# Patient Record
Sex: MALE | Race: WHITE | NOT HISPANIC OR LATINO | Employment: FULL TIME | URBAN - METROPOLITAN AREA
[De-identification: names, ages, dates, MRNs, and addresses within clinical notes are randomized per-mention and may not be internally consistent; named-entity substitution may affect disease eponyms.]

---

## 2020-06-08 ENCOUNTER — TRANSCRIBE ORDERS (OUTPATIENT)
Dept: ADMINISTRATIVE | Facility: HOSPITAL | Age: 55
End: 2020-06-08

## 2020-06-08 ENCOUNTER — HOSPITAL ENCOUNTER (OUTPATIENT)
Dept: RADIOLOGY | Facility: HOSPITAL | Age: 55
Discharge: HOME | End: 2020-06-08
Attending: PODIATRIST
Payer: COMMERCIAL

## 2020-06-08 DIAGNOSIS — S90.31XA CONTUSION OF RIGHT FOOT, INITIAL ENCOUNTER: Primary | ICD-10-CM

## 2020-06-08 DIAGNOSIS — S90.31XA CONTUSION OF RIGHT FOOT, INITIAL ENCOUNTER: ICD-10-CM

## 2020-06-08 PROCEDURE — 73630 X-RAY EXAM OF FOOT: CPT

## 2020-06-09 ENCOUNTER — APPOINTMENT (OUTPATIENT)
Dept: URGENT CARE | Age: 55
End: 2020-06-09
Payer: OTHER MISCELLANEOUS

## 2020-06-09 PROCEDURE — 90471 IMMUNIZATION ADMIN: CPT | Performed by: NURSE PRACTITIONER

## 2020-06-09 PROCEDURE — 99283 EMERGENCY DEPT VISIT LOW MDM: CPT | Performed by: NURSE PRACTITIONER

## 2020-06-09 PROCEDURE — 90715 TDAP VACCINE 7 YRS/> IM: CPT

## 2020-06-09 PROCEDURE — G0382 LEV 3 HOSP TYPE B ED VISIT: HCPCS | Performed by: NURSE PRACTITIONER

## 2024-06-21 ENCOUNTER — NURSING HOME VISIT (OUTPATIENT)
Dept: GERIATRICS | Facility: OTHER | Age: 59
End: 2024-06-21
Payer: COMMERCIAL

## 2024-06-21 DIAGNOSIS — I15.0 RENOVASCULAR HYPERTENSION: ICD-10-CM

## 2024-06-21 DIAGNOSIS — I73.9 PERIPHERAL ARTERIAL DISEASE (HCC): Primary | ICD-10-CM

## 2024-06-21 DIAGNOSIS — Z87.891 HISTORY OF TOBACCO USE DISORDER: ICD-10-CM

## 2024-06-21 DIAGNOSIS — I69.30 HISTORY OF CVA WITH RESIDUAL DEFICIT: ICD-10-CM

## 2024-06-21 DIAGNOSIS — E78.5 HYPERLIPIDEMIA, UNSPECIFIED HYPERLIPIDEMIA TYPE: ICD-10-CM

## 2024-06-21 DIAGNOSIS — Z66 DNR (DO NOT RESUSCITATE): ICD-10-CM

## 2024-06-21 DIAGNOSIS — Z89.512 HISTORY OF LEFT BELOW KNEE AMPUTATION (HCC): ICD-10-CM

## 2024-06-21 DIAGNOSIS — I70.1 RENAL ARTERY STENOSIS, NATIVE, BILATERAL (HCC): ICD-10-CM

## 2024-06-21 DIAGNOSIS — G54.6 PHANTOM PAIN FOLLOWING AMPUTATION OF LOWER LIMB (HCC): ICD-10-CM

## 2024-06-21 PROCEDURE — 99306 1ST NF CARE HIGH MDM 50: CPT | Performed by: INTERNAL MEDICINE

## 2024-06-24 PROBLEM — Z89.512 HISTORY OF LEFT BELOW KNEE AMPUTATION (HCC): Status: ACTIVE | Noted: 2024-06-24

## 2024-06-24 PROBLEM — I73.9 PERIPHERAL ARTERIAL DISEASE (HCC): Status: ACTIVE | Noted: 2024-06-24

## 2024-06-24 PROBLEM — Z66 DNR (DO NOT RESUSCITATE): Status: ACTIVE | Noted: 2024-06-21

## 2024-06-24 PROBLEM — G54.6 PHANTOM PAIN FOLLOWING AMPUTATION OF LOWER LIMB (HCC): Status: ACTIVE | Noted: 2024-06-24

## 2024-06-24 PROBLEM — Z86.73 HISTORY OF CVA (CEREBROVASCULAR ACCIDENT): Status: ACTIVE | Noted: 2024-06-24

## 2024-06-24 PROBLEM — I69.30 HISTORY OF CVA WITH RESIDUAL DEFICIT: Status: ACTIVE | Noted: 2024-06-24

## 2024-06-24 PROBLEM — I70.1 RENAL ARTERY STENOSIS, NATIVE, BILATERAL (HCC): Status: ACTIVE | Noted: 2024-06-24

## 2024-06-24 PROBLEM — I10 ESSENTIAL HYPERTENSION: Status: ACTIVE | Noted: 2024-06-24

## 2024-06-24 PROBLEM — E78.5 HYPERLIPIDEMIA: Status: ACTIVE | Noted: 2024-06-24

## 2024-06-24 PROBLEM — I15.0 RENOVASCULAR HYPERTENSION: Status: ACTIVE | Noted: 2024-06-24

## 2024-06-24 PROBLEM — Z87.891 HISTORY OF TOBACCO USE DISORDER: Status: ACTIVE | Noted: 2024-06-24

## 2024-06-24 RX ORDER — TAMSULOSIN HYDROCHLORIDE 0.4 MG/1
0.4 CAPSULE ORAL
COMMUNITY

## 2024-06-24 RX ORDER — SENNOSIDES A AND B 8.6 MG/1
1 TABLET, FILM COATED ORAL
COMMUNITY

## 2024-06-24 RX ORDER — OXYCODONE HYDROCHLORIDE 10 MG/1
10 TABLET ORAL EVERY 4 HOURS PRN
COMMUNITY

## 2024-06-24 RX ORDER — LISINOPRIL 10 MG/1
5 TABLET ORAL DAILY
COMMUNITY

## 2024-06-24 RX ORDER — ASPIRIN 81 MG/1
81 TABLET, CHEWABLE ORAL DAILY
COMMUNITY

## 2024-06-24 RX ORDER — ALLOPURINOL 100 MG/1
100 TABLET ORAL DAILY
COMMUNITY

## 2024-06-24 RX ORDER — CLOPIDOGREL BISULFATE 75 MG/1
75 TABLET ORAL DAILY
COMMUNITY

## 2024-06-24 RX ORDER — GABAPENTIN 300 MG/1
400 CAPSULE ORAL 3 TIMES DAILY
COMMUNITY

## 2024-06-24 RX ORDER — METHOCARBAMOL 500 MG/1
500 TABLET, FILM COATED ORAL 3 TIMES DAILY
COMMUNITY

## 2024-06-24 RX ORDER — POLYETHYLENE GLYCOL 3350 17 G/17G
17 POWDER, FOR SOLUTION ORAL DAILY PRN
COMMUNITY

## 2024-06-24 RX ORDER — ROSUVASTATIN CALCIUM 20 MG/1
20 TABLET, COATED ORAL DAILY
COMMUNITY

## 2024-06-24 NOTE — ASSESSMENT & PLAN NOTE
"As discussed with him during my visit, he wishes for his resuscitation status to be \"\"DO NOT RESUSCITATE\"  "

## 2024-06-24 NOTE — ASSESSMENT & PLAN NOTE
When asked, he reports not requiring nicotine replacement therapy  Will continue with monitoring for change in his condition

## 2024-06-24 NOTE — ASSESSMENT & PLAN NOTE
Will continue his prior to admission rosuvastatin, which he confirms that he was taking without difficulty prior to admission  He will follow-up with his PCP upon discharge

## 2024-06-24 NOTE — ASSESSMENT & PLAN NOTE
September 11, 2023: Bilateral renal artery angioplasty with stenting (Dr. Ba)  Will continue his prior to admission aspirin, clopidogrel, and rosuvastatin  Will continue with monitoring for change in his condition

## 2024-06-24 NOTE — ASSESSMENT & PLAN NOTE
Status post multiple bypass procedures  Will continue his prior to admission aspirin, clopidogrel, and rosuvastatin  Will continue his care in collaboration with his vascular surgery service  He will follow-up with his vascular surgery service upon discharge

## 2024-06-24 NOTE — PROGRESS NOTES
[facility]  [address]  [place] [address]  Facility: [facility] [place] [address]    HISTORY AND PHYSICAL    NAME: Todd Spaulding  AGE: 58 y.o. SEX: male    DATE OF ENCOUNTER: June 21, 2024.    Code status:   DNR    Assessment and Plan     1. Peripheral arterial disease (HCC)  Assessment & Plan:  Status post multiple bypass procedures  Will continue his prior to admission aspirin, clopidogrel, and rosuvastatin  Will continue his care in collaboration with his vascular surgery service  He will follow-up with his vascular surgery service upon discharge  2. History of left below knee amputation (HCC)  Assessment & Plan:  June 5, 2024: Secondary to occluded bypass  Will continue his care in collaboration with his vascular surgery service  Will continue with multimodal pain management (he reports and record review confirms that he was taking oxycodone 10 mg every 4 hours as needed prior to his procedure)  Secondary to his decreased level of functioning from baseline, he will be admitted to the subacute rehabilitation facility and seen in consultation by a multidisciplinary rehabilitation team for evaluation and treatment to assist him in returning to his prior level of functioning   Will continue with monitoring for changes condition  He will follow-up with his PCP and vascular surgery services upon discharge  3. Renovascular hypertension  Assessment & Plan:  Will continue his prior to admission lisinopril  Will continue with clinical and periodic laboratory monitoring for change in his condition  He will follow-up with his PCP upon discharge  4. Hyperlipidemia, unspecified hyperlipidemia type  Assessment & Plan:  Will continue his prior to admission rosuvastatin, which he confirms that he was taking without difficulty prior to admission  He will follow-up with his PCP upon discharge  5. Phantom pain following amputation of lower limb (HCC)  Assessment  "& Plan:  Will continue with multimodal pain management  To consider increasing dose of gabapentin going forward  Will continue with monitoring for change in his condition  6. Renal artery stenosis, native, bilateral (HCC)  Assessment & Plan:  September 11, 2023: Bilateral renal artery angioplasty with stenting (Dr. Ba)  Will continue his prior to admission aspirin, clopidogrel, and rosuvastatin  Will continue with monitoring for change in his condition  7. History of CVA with residual deficit  Assessment & Plan:  Reported residual of gait instability  Will continue prior to admission aspirin, clopidogrel, and rosuvastatin for secondary stroke prevention  Secondary to his decreased level of functioning from baseline, he will be admitted to the subacute rehabilitation facility and seen in consultation by a multidisciplinary rehabilitation team for evaluation and treatment to assist him in returning to his prior level of functioning   Will continue with monitoring for change in his condition  He will follow-up with his PCP upon discharge  8. History of tobacco use disorder  Assessment & Plan:  When asked, he reports not requiring nicotine replacement therapy  Will continue with monitoring for change in his condition  9. DNR (do not resuscitate)  Assessment & Plan:  As discussed with him during my visit, he wishes for his resuscitation status to be \"\"DO NOT RESUSCITATE\"    See recent hospital discharge summary note for further information.    All medications and routine orders were reviewed and updated as needed.    Plan discussed with: Patient and nursing staff.    I have spent a total time of 100 minutes on June 21, 2024 in caring for this patient including Diagnostic results, Instructions for management, Impressions, Counseling / Coordination of care, Documenting in the medical record, Reviewing / ordering tests, medicine, procedures  , Obtaining or reviewing history  , and Communicating with other healthcare " professionals .     Chief Complaint     He is seen for a visit to perform a history and physical exam to be admitted to the subacute rehabilitation facility.    History of Present Illness     History is obtained from the patient and review of outside electronic medical records.    He is a pleasant 58-year-old gentleman with the comorbidities of peripheral arterial disease status post multiple revascularization procedures, renovascular hypertension (bilateral renal artery stents in situ), hyperlipidemia, history of CVA, history of tobacco use disorder, and history of testicular cancer status post left orchiectomy and XRT who is seen for a visit to perform a history and physical exam to be admitted to the subacute rehabilitation facility.  At his visit on May 30, 2024 with his vascular surgery service, decision was made to perform a left below-knee amputation secondary to occlusion of prior left common femoral artery to left posterior tibial artery with in situ GSV.  He was admitted to the acute care hospital from June 5, 2024 through June 20, 2024.  He underwent a left below-knee amputation on June 5, 2024.  He has done well postoperatively and is transferred to the subacute rehabilitation facility for comprehensive rehabilitation program.    Other than some phantom limb pain and surgical site discomfort, he is without complaints.    HISTORY:  Past Medical History:   Diagnosis Date    Cancer (HCC)     past hx testicular cancer.    Testicular cancer (HCC)     Left orchiectomy    TIA (transient ischemic attack)     September     Past Surgical History:   Procedure Laterality Date    AORTA - BILATERAL FEMORAL ARTERY BYPASS GRAFT  09/15/2023    Dr. Ba    CATARACT EXTRACTION, BILATERAL      FEMORAL ARTERY STENT Right 08/07/2020    LEG AMPUTATION THROUGH KNEE Left 06/05/2024    Below-knee    ORCHIECTOMY Left 2000    Status post XRT    OTHER SURGICAL HISTORY      Surgery for cancer removal    RENAL ARTERY STENT  Bilateral 2023    Bilateral renal artery angioplasty and stenting (Dr. Ba)     Family History   Adopted: Yes     Social History     Socioeconomic History    Marital status: Single     Spouse name: None    Number of children: None    Years of education: None    Highest education level: None   Occupational History    None   Tobacco Use    Smoking status: Former     Current packs/day: 0.00     Types: Cigarettes     Quit date: 2023     Years since quittin.8    Smokeless tobacco: None   Substance and Sexual Activity    Alcohol use: Yes    Drug use: No    Sexual activity: None   Other Topics Concern    None   Social History Narrative    None     Social Determinants of Health     Financial Resource Strain: High Risk (2024)    Received from [facility]    Overall Financial Resource Strain (CARDIA)     Difficulty of Paying Living Expenses: Hard   Food Insecurity: Food Insecurity Present (2024)    Received from [facility]    Hunger Vital Sign     Worried About Running Out of Food in the Last Year: Sometimes true     Ran Out of Food in the Last Year: Never true   Transportation Needs: No Transportation Needs (2024)    Received from [facility]    PRAPARE - Transportation     Lack of Transportation (Medical): No     Lack of Transportation (Non-Medical): No   Physical Activity: Not on file   Stress: No Stress Concern Present (2023)    Received from [facility]    [place] [employer] - Occupational Stress Questionnaire     Feeling of Stress : Only a little   Social Connections: Feeling Socially Integrated (4/15/2024)    Received from [facility]    OASIS : Social Isolation     How often do you feel lonely or isolated from those around you?: Never   Intimate Partner Violence: Not At Risk (2024)    Received from [facility]    Humiliation, Afraid, Rape, and  Kick questionnaire     Fear of Current or Ex-Partner: No     Emotionally Abused: No     Physically Abused: No     Sexually Abused: No   Housing Stability: High Risk (6/20/2024)    Received from [facility]    Housing Stability Vital Sign     Unable to Pay for Housing in the Last Year: Yes     Number of Times Moved in the Last Year: 1     Homeless in the Last Year: Yes       Allergies:  Allergies   Allergen Reactions    Lipitor [Atorvastatin] Rash       Review of Systems     Review of Systems   Respiratory:  Negative for shortness of breath.    Cardiovascular:  Negative for chest pain.   Gastrointestinal:  Negative for constipation.   Genitourinary:  Negative for difficulty urinating.   Neurological:  Negative for headaches.        He notes discomfort at his surgical site and phantom limb discomfort.   Psychiatric/Behavioral:  Negative for suicidal ideas.        Medications and orders     All medications reviewed and updated in [facility] EMR.      Objective     Vitals: Weight 179 pounds, temperature 98 °F, pulse 68, blood pressure 144/70.    Physical Exam  Vitals reviewed.   Constitutional:       General: He is awake. He is not in acute distress.     Appearance: He is well-developed. He is not toxic-appearing or diaphoretic.      Comments: He appears comfortable lying in bed, stated age, and healthy.   Eyes:      General: No scleral icterus.  Cardiovascular:      Rate and Rhythm: Normal rate and regular rhythm.      Heart sounds: Normal heart sounds. No murmur heard.     No friction rub. No gallop.   Pulmonary:      Effort: No respiratory distress.      Breath sounds: Normal breath sounds. No stridor. No wheezing, rhonchi or rales.   Musculoskeletal:      Cervical back: Neck supple.      Right lower leg: No edema.      Left lower leg: No edema.      Comments: Left BKA dressing in place.   Neurological:      Mental Status: He is alert.   Psychiatric:         Mood and Affect: Mood normal.          "Behavior: Behavior normal. Behavior is cooperative.       Pertinent Laboratory/Diagnostic Studies:   The following labs/studies were reviewed please see chart or hospital paperwork for details.    June 6, 2024:    Magnesium: 1.9    CBC with differential: WBC count 7.5, hemoglobin 10.8, hematocrit 31.9, platelet count 233,000, MCV 86    BMP: Sodium 135, potassium 4.1, BUN 13, creatinine 0.81, fasting blood sugar 102, EGFR 103    - His order summary was reviewed and signed.  New orders reviewed with nursing staff.    Portions of the record may have been created with voice recognition software.  Occasional wrong word or \"sound a like\" substitutions may have occurred due to the inherent limitations of voice recognition software.  Read the chart carefully and recognize, using context, where substitutions have occurred.    Duong Iqbal M.D.  6/24/2024 9:21 AM      "

## 2024-06-24 NOTE — ASSESSMENT & PLAN NOTE
Reported residual of gait instability  Will continue prior to admission aspirin, clopidogrel, and rosuvastatin for secondary stroke prevention  Secondary to his decreased level of functioning from baseline, he will be admitted to the subacute rehabilitation facility and seen in consultation by a multidisciplinary rehabilitation team for evaluation and treatment to assist him in returning to his prior level of functioning   Will continue with monitoring for change in his condition  He will follow-up with his PCP upon discharge

## 2024-06-24 NOTE — ASSESSMENT & PLAN NOTE
Will continue with multimodal pain management  To consider increasing dose of gabapentin going forward  Will continue with monitoring for change in his condition

## 2024-06-24 NOTE — ASSESSMENT & PLAN NOTE
Will continue his prior to admission lisinopril  Will continue with clinical and periodic laboratory monitoring for change in his condition  He will follow-up with his PCP upon discharge

## 2024-06-24 NOTE — ASSESSMENT & PLAN NOTE
June 5, 2024: Secondary to occluded bypass  Will continue his care in collaboration with his vascular surgery service  Will continue with multimodal pain management (he reports and record review confirms that he was taking oxycodone 10 mg every 4 hours as needed prior to his procedure)  Secondary to his decreased level of functioning from baseline, he will be admitted to the subacute rehabilitation facility and seen in consultation by a multidisciplinary rehabilitation team for evaluation and treatment to assist him in returning to his prior level of functioning   Will continue with monitoring for changes condition  He will follow-up with his PCP and vascular surgery services upon discharge

## 2024-06-28 ENCOUNTER — NURSING HOME VISIT (OUTPATIENT)
Dept: GERIATRICS | Facility: OTHER | Age: 59
End: 2024-06-28
Payer: COMMERCIAL

## 2024-06-28 DIAGNOSIS — Z66 DNR (DO NOT RESUSCITATE): ICD-10-CM

## 2024-06-28 DIAGNOSIS — Z89.512 HISTORY OF LEFT BELOW KNEE AMPUTATION (HCC): ICD-10-CM

## 2024-06-28 DIAGNOSIS — I73.9 PERIPHERAL ARTERIAL DISEASE (HCC): Primary | ICD-10-CM

## 2024-06-28 DIAGNOSIS — I15.0 RENOVASCULAR HYPERTENSION: ICD-10-CM

## 2024-06-28 PROCEDURE — 99309 SBSQ NF CARE MODERATE MDM 30: CPT | Performed by: PHYSICIAN ASSISTANT

## 2024-06-30 VITALS
WEIGHT: 177.9 LBS | DIASTOLIC BLOOD PRESSURE: 56 MMHG | HEART RATE: 56 BPM | SYSTOLIC BLOOD PRESSURE: 104 MMHG | RESPIRATION RATE: 18 BRPM | TEMPERATURE: 98 F

## 2024-06-30 NOTE — PROGRESS NOTES
[facility]  [address] [address]  [phone]  [facility]  Code 31      NAME: Todd Spaulding  AGE: 58 y.o. SEX: male [phone]    DATE OF ENCOUNTER: 6/28/24    CODE STATUS: DNR    Assessment and Plan     Problem List Items Addressed This Visit          Cardiovascular and Mediastinum    Peripheral arterial disease (HCC) - Primary     S/p multiple bypass procedures  Continue statin, plavix, ASA         Renovascular hypertension     Continue lisinopril            Care Coordination    DNR (do not resuscitate)    History of left below knee amputation (HCC)       No orders of the defined types were placed in this encounter.          Chief Complaint     Chief Complaint   Patient presents with    Geriatric Evaluation     Follow up       History of Present Illness   58 year old male being seen today for follow up. He was admitted to [facility] for rehab after a recent BKA. His pain is well controlled. He is participating in therapy         The following portions of the patient's history were reviewed and updated as appropriate: allergies, current medications, past family history, past medical history, past social history, past surgical history and problem list.    Review of Systems   Review of Systems   Constitutional: Negative.    HENT: Negative.     Eyes: Negative.    Respiratory: Negative.     Cardiovascular: Negative.    Gastrointestinal: Negative.    Endocrine: Negative.    Genitourinary: Negative.    Musculoskeletal:  Positive for gait problem.   Hematological: Negative.    Psychiatric/Behavioral: Negative.            Active Problem List     Patient Active Problem List   Diagnosis    Peripheral arterial disease (HCC)    Renovascular hypertension    Hyperlipidemia    Renal artery stenosis, native, bilateral (HCC)    History of CVA with residual deficit    History of tobacco use disorder    DNR (do not resuscitate)    History of testicular cancer    History of  left below knee amputation (HCC)    Phantom pain following amputation of lower limb (HCC)         Objective     /56   Pulse 56   Temp 98 °F (36.7 °C)   Resp 18   Wt 80.7 kg (177 lb 14.4 oz)     Physical Exam  Vitals reviewed.   Constitutional:       General: He is not in acute distress.     Appearance: He is not ill-appearing or diaphoretic.   HENT:      Head: Normocephalic and atraumatic.      Nose: Nose normal.      Mouth/Throat:      Pharynx: Oropharynx is clear.   Cardiovascular:      Rate and Rhythm: Normal rate.   Pulmonary:      Effort: Pulmonary effort is normal. No respiratory distress.      Breath sounds: Normal breath sounds.   Abdominal:      General: Bowel sounds are normal. There is no distension.      Palpations: Abdomen is soft.      Tenderness: There is no abdominal tenderness.   Musculoskeletal:      Comments: S/p BKA, dressing C/D/I   Skin:     General: Skin is warm and dry.   Neurological:      Mental Status: He is alert and oriented to person, place, and time.         Pertinent Laboratory/Diagnostic Studies:  CBC:   Lab Results   Component Value Date/Time    WBC 12.90 (H) 08/11/2015 02:35 PM    RBC 5.16 08/11/2015 02:35 PM    HGB 8.6 (L) 04/02/2024 12:05 PM    HCT 17.1 (L) 03/28/2024 06:22 PM    MCV 90 08/11/2015 02:35 PM    MCH 30.0 08/11/2015 02:35 PM    MCHC 33.4 08/11/2015 02:35 PM    RDW 14.6 08/11/2015 02:35 PM    MPV 10.5 08/11/2015 02:35 PM     08/11/2015 02:35 PM    NEUTOPHILPCT 78 (H) 08/11/2015 02:35 PM    LYMPHOPCT 13 (L) 08/11/2015 02:35 PM    MONOPCT 7 08/11/2015 02:35 PM    EOSPCT 2 08/11/2015 02:35 PM    NEUTROABS 10.01 (H) 08/11/2015 02:35 PM    LYMPHSABS 1.72 08/11/2015 02:35 PM    MONOSABS 0.93 08/11/2015 02:35 PM    EOSABS 0.19 08/11/2015 02:35 PM     Chemistry Profile:   Lab Results   Component Value Date/Time     08/11/2015 02:35 PM    K 4.1 06/06/2024 04:33 AM     06/06/2024 04:33 AM    CO2 27 06/06/2024 04:33 AM    ANIONGAP 12 08/11/2015 02:35  PM    BUN 13 06/06/2024 04:33 AM    CREATININE 0.81 06/06/2024 04:33 AM    GLUC 102 (H) 06/06/2024 04:33 AM    GLUCOSE 134 08/11/2015 02:35 PM    CALCIUM 8.6 06/06/2024 04:33 AM    MG 1.9 06/06/2024 04:33 AM    PHOS 3.7 03/29/2024 03:27 AM    AST 12 06/05/2024 12:30 PM    ALT 12 06/05/2024 12:30 PM    ALKPHOS 130 (H) 06/05/2024 12:30 PM    PROT 7.1 08/11/2015 02:35 PM    BILITOT 0.48 08/11/2015 02:35 PM    EGFR 103 06/06/2024 04:33 AM    EGFR 98 08/08/2020 04:36 AM         Current Medications   Medications reviewed and updated in facility chart.

## 2024-06-30 NOTE — ASSESSMENT & PLAN NOTE
Left BKA 6/5/24 secondary to occluded bypass  Pain controlled  Follow up with surgery as scheduled  Continue PT/OT

## 2024-07-05 ENCOUNTER — NURSING HOME VISIT (OUTPATIENT)
Dept: GERIATRICS | Facility: OTHER | Age: 59
End: 2024-07-05
Payer: COMMERCIAL

## 2024-07-05 VITALS
HEART RATE: 54 BPM | TEMPERATURE: 97.8 F | WEIGHT: 177 LBS | SYSTOLIC BLOOD PRESSURE: 105 MMHG | RESPIRATION RATE: 18 BRPM | DIASTOLIC BLOOD PRESSURE: 55 MMHG

## 2024-07-05 DIAGNOSIS — Z66 DNR (DO NOT RESUSCITATE): ICD-10-CM

## 2024-07-05 DIAGNOSIS — I73.9 PERIPHERAL ARTERIAL DISEASE (HCC): Primary | ICD-10-CM

## 2024-07-05 DIAGNOSIS — Z89.512 HISTORY OF LEFT BELOW KNEE AMPUTATION (HCC): ICD-10-CM

## 2024-07-05 DIAGNOSIS — I15.0 RENOVASCULAR HYPERTENSION: ICD-10-CM

## 2024-07-05 DIAGNOSIS — G54.6 PHANTOM PAIN FOLLOWING AMPUTATION OF LOWER LIMB (HCC): ICD-10-CM

## 2024-07-05 PROCEDURE — 99309 SBSQ NF CARE MODERATE MDM 30: CPT | Performed by: PHYSICIAN ASSISTANT

## 2024-07-05 NOTE — PROGRESS NOTES
[facility]  [address] [address]  [phone]  [facility]  Code 31      NAME: Todd Spaulding  AGE: 58 y.o. SEX: male [phone]    DATE OF ENCOUNTER: 7/5/2024    CODE STATUS: DNR    Assessment and Plan     Problem List Items Addressed This Visit          Cardiovascular and Mediastinum    Peripheral arterial disease (HCC) - Primary     Continue ASA, plavix, statin         Renovascular hypertension     BP has been consistently running low, at times with SBP in the 90's  Currently on lisinopril 10 mg, will decrease to 5mg daily for now  Patient states that the lisinopril was held completely in the hospital for low BP. Continue to monitor VS closely and consider discontinuing if BP continues to run low            Care Coordination    DNR (do not resuscitate)    History of left below knee amputation (HCC)     Was seen by vascular surgery 7/3  Staples removed  Incision healing well  Per vascular- OK to schedule physiatry appt to start planning for prosthesis  Continue oxy for pain            Surgery/Wound/Pain    Phantom pain following amputation of lower limb (HCC)     Patient with ongoing pain post op, asking for his PRN oxycodone every 4 hours  Gabapentin was increased to 400 mg TID  Plan to send to [facility] pain management            No orders of the defined types were placed in this encounter.          Chief Complaint     Chief Complaint   Patient presents with    Geriatric Evaluation     Follow up       History of Present Illness   58 year old male being seen today for follow up. He is admitted to [facility] for rehab after a recent BKA. He continues to use his PRN oxy several times per day. He was cleared by vascular surgery to be sent to physiatry to start the process for prosthesis.          The following portions of the patient's history were reviewed and updated as appropriate: allergies, current medications, past family history, past medical history, past  social history, past surgical history and problem list.    Review of Systems   Review of Systems   Constitutional: Negative.    HENT: Negative.     Eyes: Negative.    Respiratory: Negative.     Cardiovascular: Negative.    Gastrointestinal: Negative.    Endocrine: Negative.    Genitourinary: Negative.    Musculoskeletal:  Positive for arthralgias and gait problem.   Hematological: Negative.    Psychiatric/Behavioral: Negative.            Active Problem List     Patient Active Problem List   Diagnosis    Peripheral arterial disease (HCC)    Renovascular hypertension    Hyperlipidemia    Renal artery stenosis, native, bilateral (HCC)    History of CVA with residual deficit    History of tobacco use disorder    DNR (do not resuscitate)    History of testicular cancer    History of left below knee amputation (HCC)    Phantom pain following amputation of lower limb (HCC)         Objective     /55   Pulse (!) 54   Temp 97.8 °F (36.6 °C)   Resp 18   Wt 80.3 kg (177 lb)     Physical Exam  Vitals reviewed.   Constitutional:       General: He is not in acute distress.     Appearance: He is not ill-appearing or diaphoretic.   HENT:      Head: Normocephalic and atraumatic.      Nose: Nose normal.      Mouth/Throat:      Pharynx: Oropharynx is clear.   Cardiovascular:      Rate and Rhythm: Normal rate.   Pulmonary:      Effort: Pulmonary effort is normal. No respiratory distress.   Abdominal:      General: There is no distension.      Palpations: Abdomen is soft.      Tenderness: There is no abdominal tenderness.   Musculoskeletal:      Comments: S/p BKA- incision healing well, staples removed 7/3   Skin:     General: Skin is warm and dry.   Neurological:      Mental Status: He is alert and oriented to person, place, and time.                 Current Medications   Medications reviewed and updated in facility chart.

## 2024-07-05 NOTE — ASSESSMENT & PLAN NOTE
BP has been consistently running low, at times with SBP in the 90's  Currently on lisinopril 10 mg, will decrease to 5mg daily for now  Patient states that the lisinopril was held completely in the hospital for low BP. Continue to monitor VS closely and consider discontinuing if BP continues to run low

## 2024-07-05 NOTE — ASSESSMENT & PLAN NOTE
Patient with ongoing pain post op, asking for his PRN oxycodone every 4 hours  Gabapentin was increased to 400 mg TID  Plan to send to [facility] pain management

## 2024-07-05 NOTE — ASSESSMENT & PLAN NOTE
Was seen by vascular surgery 7/3  Staples removed  Incision healing well  Per vascular- OK to schedule physiatry appt to start planning for prosthesis  Continue oxy for pain

## 2024-07-10 ENCOUNTER — NURSING HOME VISIT (OUTPATIENT)
Dept: GERIATRICS | Facility: OTHER | Age: 59
End: 2024-07-10
Payer: COMMERCIAL

## 2024-07-10 VITALS
TEMPERATURE: 97.8 F | OXYGEN SATURATION: 99 % | SYSTOLIC BLOOD PRESSURE: 104 MMHG | HEART RATE: 58 BPM | DIASTOLIC BLOOD PRESSURE: 60 MMHG

## 2024-07-10 DIAGNOSIS — I69.30 HISTORY OF CVA WITH RESIDUAL DEFICIT: ICD-10-CM

## 2024-07-10 DIAGNOSIS — I73.9 PERIPHERAL ARTERIAL DISEASE (HCC): ICD-10-CM

## 2024-07-10 DIAGNOSIS — I15.0 RENOVASCULAR HYPERTENSION: ICD-10-CM

## 2024-07-10 DIAGNOSIS — Z59.01 SHELTERED HOMELESSNESS: ICD-10-CM

## 2024-07-10 DIAGNOSIS — Z89.512 HISTORY OF LEFT BELOW KNEE AMPUTATION (HCC): Primary | ICD-10-CM

## 2024-07-10 DIAGNOSIS — F19.11 HISTORY OF SUBSTANCE ABUSE (HCC): ICD-10-CM

## 2024-07-10 DIAGNOSIS — G54.6 PHANTOM PAIN FOLLOWING AMPUTATION OF LOWER LIMB (HCC): ICD-10-CM

## 2024-07-10 PROCEDURE — 99309 SBSQ NF CARE MODERATE MDM 30: CPT

## 2024-07-10 SDOH — ECONOMIC STABILITY - HOUSING INSECURITY: SHELTERED HOMELESSNESS: Z59.01

## 2024-07-10 NOTE — PROGRESS NOTES
[facility]  [facility]  [address], [place], [place]  [phone]  SNF Rehab: POS 31    NAME: Todd Spaulding  AGE: 58 y.o. SEX: male  : 1965     DATE: 7/10/2024    CODE STATUS: No CPR     Assessment and Plan:     Problem List Items Addressed This Visit       Peripheral arterial disease (HCC)     Status post multiple bypass procedures   Continue ASA, plavix, statin         Renovascular hypertension     Recent hypotension requiring reduction of lisinopril  Blood pressures have improved  Continue lisinopril 5 mg daily  Continue to monitor blood pressure         History of CVA with residual deficit     Continue secondary stroke prevention with aspirin, clopidogrel, and rosuvastatin         History of left below knee amputation (HCC) - Primary     Patient with left hallux amputation at [facility] on 2024  On follow-up visit patient found to have bypass occlusion with amputation site more painful and red  Recommended for left BKA  Underwent BKA on 2024  Had follow-up visit with vascular surgery on 7/3/2024 where staples were removed  Per vascular: okay to proceed to stump  and physiatry for prosthesis fitting  Continue PT/OT  Continue oxycodone for pain control         Phantom pain following amputation of lower limb (HCC)     Continues with phantom limb pain  Continue oxycodone as needed  Continue gabapentin         History of substance abuse ([facility])     Reports history of crystal meth abuse  Currently 28 years sober  Patient plans to cut down on oxycodone use as much as possible           Sheltered homelessness     Previously living in homeless shelter  Case management following for discharge planning                History of Present Illness:     Patient is a 58 y.o. old male being seen at [facility] for follow up of acute and chronic medical conditions. He is doing well today. He continues with occasional pain from L knee wound as well as phantom limb pain. He reports  trying to stretch out time between oxycodone. He is currently going through prosthesis process and shrinkage of residual limb. No acute concerns.         The following portions of the patient's history were reviewed and updated as appropriate: allergies, current medications, past family history, past medical history, past social history, past surgical history and problem list.     Review of Systems:     Review of Systems   Musculoskeletal:  Positive for gait problem and myalgias.   Skin:  Positive for wound.   Neurological:         Phantom limb  pain   All other systems reviewed and are negative.       Problem List:     Patient Active Problem List   Diagnosis    Peripheral arterial disease (HCC)    Renovascular hypertension    Hyperlipidemia    Renal artery stenosis, native, bilateral (HCC)    History of CVA with residual deficit    History of tobacco use disorder    DNR (do not resuscitate)    History of testicular cancer    History of left below knee amputation (HCC)    Phantom pain following amputation of lower limb (HCC)    History of substance abuse (HCC)    Sheltered homelessness        Objective:     /60   Pulse 58   Temp 97.8 °F (36.6 °C)   SpO2 99%     Physical Exam  Vitals and nursing note reviewed.   Constitutional:       General: He is not in acute distress.     Appearance: He is well-developed.   HENT:      Head: Normocephalic and atraumatic.   Eyes:      Conjunctiva/sclera: Conjunctivae normal.   Cardiovascular:      Rate and Rhythm: Normal rate and regular rhythm.      Heart sounds: No murmur heard.  Pulmonary:      Effort: Pulmonary effort is normal. No respiratory distress.      Breath sounds: Normal breath sounds.   Abdominal:      Palpations: Abdomen is soft.      Tenderness: There is no abdominal tenderness.   Musculoskeletal:         General: No swelling.      Cervical back: Neck supple.      Right lower leg: No edema.   Skin:     General: Skin is warm and dry.      Capillary Refill:  Capillary refill takes less than 2 seconds.   Neurological:      General: No focal deficit present.      Mental Status: He is alert. Mental status is at baseline.      Gait: Gait abnormal.   Psychiatric:         Mood and Affect: Mood normal.         Behavior: Behavior normal.         Pertinent Laboratory/Diagnostic Studies:    Laboratory Results: I have personally reviewed the pertinent laboratory results/reports     Radiology/Other Diagnostic Testing Results: I have personally reviewed pertinent reports.      FELICIA Stevens  Geriatric Medicine

## 2024-07-10 NOTE — ASSESSMENT & PLAN NOTE
Patient with left hallux amputation at [facility] on 4/1/2024  On follow-up visit patient found to have bypass occlusion with amputation site more painful and red  Recommended for left BKA  Underwent BKA on 6/5/2024  Had follow-up visit with vascular surgery on 7/3/2024 where staples were removed  Per vascular: okay to proceed to stump  and physiatry for prosthesis fitting  Continue PT/OT  Continue oxycodone for pain control

## 2024-07-10 NOTE — ASSESSMENT & PLAN NOTE
Reports history of crystal meth abuse  Currently 28 years sober  Patient plans to cut down on oxycodone use as much as possible

## 2024-07-10 NOTE — ASSESSMENT & PLAN NOTE
Recent hypotension requiring reduction of lisinopril  Blood pressures have improved  Continue lisinopril 5 mg daily  Continue to monitor blood pressure

## 2024-07-18 ENCOUNTER — NURSING HOME VISIT (OUTPATIENT)
Dept: GERIATRICS | Facility: OTHER | Age: 59
End: 2024-07-18
Payer: COMMERCIAL

## 2024-07-18 DIAGNOSIS — Z89.512 HISTORY OF LEFT BELOW KNEE AMPUTATION (HCC): ICD-10-CM

## 2024-07-18 DIAGNOSIS — G54.6 PHANTOM PAIN FOLLOWING AMPUTATION OF LOWER LIMB (HCC): ICD-10-CM

## 2024-07-18 DIAGNOSIS — I73.9 PERIPHERAL ARTERIAL DISEASE (HCC): Primary | ICD-10-CM

## 2024-07-18 DIAGNOSIS — Z66 DNR (DO NOT RESUSCITATE): ICD-10-CM

## 2024-07-18 PROCEDURE — 99309 SBSQ NF CARE MODERATE MDM 30: CPT | Performed by: PHYSICIAN ASSISTANT

## 2024-07-18 NOTE — ASSESSMENT & PLAN NOTE
S/p SANG 6/5/24  Follows with vascular surgery- OK to proceed with physiatry evaluation for prothesis fitting  Continue PT/OT

## 2024-07-18 NOTE — PROGRESS NOTES
[facility]  [address] [address]  [phone]  [facility]  Code 31       NAME: Todd Spaulding  AGE: 58 y.o. SEX: male [phone]    DATE OF ENCOUNTER: 7/18/2024    CODE STATUS: DNR    Assessment and Plan     Problem List Items Addressed This Visit          Cardiovascular and Mediastinum    Peripheral arterial disease (HCC) - Primary     S/p multiple bypass surgeries  Continue ASA, plavix, statin            Care Coordination    DNR (do not resuscitate)    History of left below knee amputation (HCC)     S/p BKA 6/5/24  Follows with vascular surgery- OK to proceed with physiatry evaluation for prothesis fitting  Continue PT/OT            Surgery/Wound/Pain    Phantom pain following amputation of lower limb (HCC)       No orders of the defined types were placed in this encounter.          Chief Complaint   No chief complaint on file.      History of Present Illness   58 year old male being seen today for follow up. He is admitted to [facility] for therapy after his recent BKA. He is cleared to follow up with physiatry for prosthesis fitting.          The following portions of the patient's history were reviewed and updated as appropriate: allergies, current medications, past family history, past medical history, past social history, past surgical history and problem list.    Review of Systems   Review of Systems   Constitutional: Negative.    HENT: Negative.     Eyes: Negative.    Respiratory: Negative.     Cardiovascular: Negative.    Gastrointestinal: Negative.    Endocrine: Negative.    Genitourinary: Negative.    Musculoskeletal:  Positive for gait problem.   Hematological: Negative.    Psychiatric/Behavioral: Negative.            Active Problem List     Patient Active Problem List   Diagnosis    Peripheral arterial disease (HCC)    Renovascular hypertension    Hyperlipidemia    Renal artery stenosis, native, bilateral (HCC)    History of CVA with residual  deficit    History of tobacco use disorder    DNR (do not resuscitate)    History of testicular cancer    History of left below knee amputation (HCC)    Phantom pain following amputation of lower limb (HCC)    History of substance abuse (HCC)    Sheltered homelessness         Objective     There were no vitals taken for this visit.    Physical Exam  Vitals reviewed.   Constitutional:       General: He is not in acute distress.     Appearance: He is not ill-appearing or diaphoretic.   HENT:      Head: Normocephalic and atraumatic.      Nose: Nose normal.      Mouth/Throat:      Pharynx: Oropharynx is clear.   Cardiovascular:      Rate and Rhythm: Normal rate.   Pulmonary:      Effort: Pulmonary effort is normal. No respiratory distress.      Breath sounds: Normal breath sounds.   Abdominal:      General: Bowel sounds are normal. There is no distension.      Palpations: Abdomen is soft.      Tenderness: There is no abdominal tenderness.   Musculoskeletal:      Comments: BKA incision C/D/I   Skin:     General: Skin is warm and dry.   Neurological:      Mental Status: He is alert and oriented to person, place, and time.         Pertinent Laboratory/Diagnostic Studies:  CBC:   Lab Results   Component Value Date/Time    WBC 12.90 (H) 08/11/2015 02:35 PM    RBC 5.16 08/11/2015 02:35 PM    HGB 8.6 (L) 04/02/2024 12:05 PM    HCT 17.1 (L) 03/28/2024 06:22 PM    MCV 90 08/11/2015 02:35 PM    MCH 30.0 08/11/2015 02:35 PM    MCHC 33.4 08/11/2015 02:35 PM    RDW 14.6 08/11/2015 02:35 PM    MPV 10.5 08/11/2015 02:35 PM     08/11/2015 02:35 PM    NEUTOPHILPCT 78 (H) 08/11/2015 02:35 PM    LYMPHOPCT 13 (L) 08/11/2015 02:35 PM    MONOPCT 7 08/11/2015 02:35 PM    EOSPCT 2 08/11/2015 02:35 PM    NEUTROABS 10.01 (H) 08/11/2015 02:35 PM    LYMPHSABS 1.72 08/11/2015 02:35 PM    MONOSABS 0.93 08/11/2015 02:35 PM    EOSABS 0.19 08/11/2015 02:35 PM     Chemistry Profile:   Lab Results   Component Value Date/Time     08/11/2015  02:35 PM    K 4.1 06/06/2024 04:33 AM     06/06/2024 04:33 AM    CO2 27 06/06/2024 04:33 AM    ANIONGAP 12 08/11/2015 02:35 PM    BUN 13 06/06/2024 04:33 AM    CREATININE 0.81 06/06/2024 04:33 AM    GLUC 102 (H) 06/06/2024 04:33 AM    GLUCOSE 134 08/11/2015 02:35 PM    CALCIUM 8.6 06/06/2024 04:33 AM    MG 1.9 06/06/2024 04:33 AM    PHOS 3.7 03/29/2024 03:27 AM    AST 12 06/05/2024 12:30 PM    ALT 12 06/05/2024 12:30 PM    ALKPHOS 130 (H) 06/05/2024 12:30 PM    PROT 7.1 08/11/2015 02:35 PM    BILITOT 0.48 08/11/2015 02:35 PM    EGFR 103 06/06/2024 04:33 AM    EGFR 98 08/08/2020 04:36 AM         Current Medications   Medications reviewed and updated in facility chart.

## 2024-07-19 ENCOUNTER — NURSING HOME VISIT (OUTPATIENT)
Dept: GERIATRICS | Facility: OTHER | Age: 59
End: 2024-07-19
Payer: COMMERCIAL

## 2024-07-19 VITALS
SYSTOLIC BLOOD PRESSURE: 121 MMHG | DIASTOLIC BLOOD PRESSURE: 65 MMHG | TEMPERATURE: 98.2 F | RESPIRATION RATE: 20 BRPM | WEIGHT: 180.2 LBS | HEART RATE: 65 BPM

## 2024-07-19 DIAGNOSIS — L03.012 PARONYCHIA OF FINGER OF LEFT HAND: Primary | ICD-10-CM

## 2024-07-19 PROBLEM — L03.019 PARONYCHIA OF FINGER: Status: ACTIVE | Noted: 2024-07-19

## 2024-07-19 PROCEDURE — 99308 SBSQ NF CARE LOW MDM 20: CPT | Performed by: PHYSICIAN ASSISTANT

## 2024-07-19 NOTE — PROGRESS NOTES
[facility]  [address] [address]  [phone]  [facility]  Code 32      NAME: Todd Spaulding  AGE: 58 y.o. SEX: male [phone]    DATE OF ENCOUNTER: 7/19/2024    CODE STATUS: DNR    Assessment and Plan     Problem List Items Addressed This Visit          Musculoskeletal and Integument    Paronychia of finger - Primary     Left 4th finger  Patient states it has looked infected for 2-3 days but he just reported it now. He has been applying bandaids himself. When it didn't get better he reported it to staff  Afebrile  Will order augmentin and local care with RENE and bandaid  Continue to monitor            No orders of the defined types were placed in this encounter.          Chief Complaint     Chief Complaint   Patient presents with    Geriatric Evaluation     Infected finger nail       History of Present Illness   58 year old male being seen today after he reported infection around the nail of his left 4th finger. He states he has been putting a bandaid on it but when it didn't get better he reported it. He is concerned because he states he had an ingrown toe nail that turned into him needing a BKA         The following portions of the patient's history were reviewed and updated as appropriate: allergies, current medications, past family history, past medical history, past social history, past surgical history and problem list.    Review of Systems   Review of Systems   Constitutional: Negative.    Skin:  Positive for color change and wound.          Active Problem List     Patient Active Problem List   Diagnosis    Peripheral arterial disease (HCC)    Renovascular hypertension    Hyperlipidemia    Renal artery stenosis, native, bilateral (HCC)    History of CVA with residual deficit    History of tobacco use disorder    DNR (do not resuscitate)    History of testicular cancer    History of left below knee amputation (HCC)    Phantom pain following  amputation of lower limb (HCC)    History of substance abuse (HCC)    Sheltered homelessness    Paronychia of finger         Objective     /65   Pulse 65   Temp 98.2 °F (36.8 °C)   Resp 20   Wt 81.7 kg (180 lb 3.2 oz)     Physical Exam  Vitals reviewed.   Constitutional:       General: He is not in acute distress.     Appearance: He is not ill-appearing or diaphoretic.   Musculoskeletal:         General: No signs of injury.   Skin:     Comments: Skin around nail of left 4th finger red and swollen. No drainage noted   Neurological:      Mental Status: He is alert.                 Current Medications   Medications reviewed and updated in facility chart.

## 2024-07-19 NOTE — ASSESSMENT & PLAN NOTE
Left 4th finger  Patient states it has looked infected for 2-3 days but he just reported it now. He has been applying bandaids himself. When it didn't get better he reported it to staff  Afebrile  Will order augmentin and local care with RENE and bandaid  Continue to monitor

## 2024-07-25 ENCOUNTER — NURSING HOME VISIT (OUTPATIENT)
Dept: GERIATRICS | Facility: OTHER | Age: 59
End: 2024-07-25
Payer: COMMERCIAL

## 2024-07-25 VITALS
RESPIRATION RATE: 18 BRPM | DIASTOLIC BLOOD PRESSURE: 65 MMHG | TEMPERATURE: 98 F | WEIGHT: 180.2 LBS | SYSTOLIC BLOOD PRESSURE: 124 MMHG | HEART RATE: 63 BPM

## 2024-07-25 DIAGNOSIS — G54.6 PHANTOM PAIN FOLLOWING AMPUTATION OF LOWER LIMB (HCC): ICD-10-CM

## 2024-07-25 DIAGNOSIS — E78.5 HYPERLIPIDEMIA, UNSPECIFIED HYPERLIPIDEMIA TYPE: ICD-10-CM

## 2024-07-25 DIAGNOSIS — Z66 DNR (DO NOT RESUSCITATE): ICD-10-CM

## 2024-07-25 DIAGNOSIS — Z89.512 HISTORY OF LEFT BELOW KNEE AMPUTATION (HCC): ICD-10-CM

## 2024-07-25 DIAGNOSIS — L03.012 PARONYCHIA OF FINGER OF LEFT HAND: Primary | ICD-10-CM

## 2024-07-25 PROCEDURE — 99309 SBSQ NF CARE MODERATE MDM 30: CPT | Performed by: PHYSICIAN ASSISTANT

## 2024-07-25 NOTE — ASSESSMENT & PLAN NOTE
SANG 6/5/24  Remains in rehab for now until he is fitted with prosthesis because he is homeless and needs prosthesis for mobilization  Has been cleared by vascular surgery to be seen by physiatry to start preparing for prosthesis fitting

## 2024-07-25 NOTE — ASSESSMENT & PLAN NOTE
Was started on augmentin last week  Redness now resolved, swelling much improved  Continue to monitor

## 2024-07-25 NOTE — PROGRESS NOTES
[facility]  [address] [address]  [phone]  [facility]  Code 31      NAME: Todd Spaulding  AGE: 58 y.o. SEX: male [phone]    DATE OF ENCOUNTER: 7/25/2024    CODE STATUS: DNR    Assessment and Plan     Problem List Items Addressed This Visit          Musculoskeletal and Integument    Paronychia of finger - Primary     Was started on augmentin last week  Redness now resolved, swelling much improved  Continue to monitor            Care Coordination    DNR (do not resuscitate)    History of left below knee amputation (HCC)     BKA 6/5/24  Remains in rehab for now until he is fitted with prosthesis because he is homeless and needs prosthesis for mobilization  Has been cleared by vascular surgery to be seen by physiatry to start preparing for prosthesis fitting            Surgery/Wound/Pain    Phantom pain following amputation of lower limb (HCC)     Consult previously written for pain management  Continue oxycodone, gabapentin, robaxin             Other    Hyperlipidemia     Continue statin            No orders of the defined types were placed in this encounter.          Chief Complaint     Chief Complaint   Patient presents with    Geriatric Evaluation     Follow up       History of Present Illness   58 year old male being seen today for follow up for chronic conditions. Patient feels well, no new complaints. Has been on antibiotics for an infected finger which is improved. Pain same, pain med regimen effective.          The following portions of the patient's history were reviewed and updated as appropriate: allergies, current medications, past family history, past medical history, past social history, past surgical history and problem list.    Review of Systems   Review of Systems   Constitutional: Negative.    HENT: Negative.     Eyes: Negative.    Respiratory: Negative.     Cardiovascular: Negative.    Gastrointestinal: Negative.    Endocrine:  Negative.    Genitourinary: Negative.    Musculoskeletal:  Positive for arthralgias and gait problem.   Hematological: Negative.    Psychiatric/Behavioral: Negative.            Active Problem List     Patient Active Problem List   Diagnosis    Peripheral arterial disease (HCC)    Renovascular hypertension    Hyperlipidemia    Renal artery stenosis, native, bilateral (HCC)    History of CVA with residual deficit    History of tobacco use disorder    DNR (do not resuscitate)    History of testicular cancer    History of left below knee amputation (HCC)    Phantom pain following amputation of lower limb (HCC)    History of substance abuse (HCC)    Sheltered homelessness    Paronychia of finger         Objective     /65   Pulse 63   Temp 98 °F (36.7 °C)   Resp 18   Wt 81.7 kg (180 lb 3.2 oz)     Physical Exam  Vitals reviewed.   Constitutional:       General: He is not in acute distress.     Appearance: He is not ill-appearing or diaphoretic.   HENT:      Head: Normocephalic and atraumatic.      Nose: Nose normal.      Mouth/Throat:      Pharynx: Oropharynx is clear.   Cardiovascular:      Rate and Rhythm: Normal rate.   Pulmonary:      Effort: Pulmonary effort is normal. No respiratory distress.   Abdominal:      General: There is no distension.      Palpations: Abdomen is soft.      Tenderness: There is no abdominal tenderness.   Musculoskeletal:      Comments: S/p BKA, incision C/D/I   Skin:     General: Skin is warm and dry.   Neurological:      Mental Status: He is alert and oriented to person, place, and time.         Pertinent Laboratory/Diagnostic Studies:    CBC:   Results from Last 12 Months   Lab Units 04/02/24  1205 03/28/24  1822   HEMOGLOBIN g/dL 8.6* 5.8*   HEMATOCRIT %  --  17.1*     Chemistry Profile:   Results from Last 12 Months   Lab Units 06/06/24  0433 06/05/24  1230 03/30/24  0231 03/29/24  0327   POTASSIUM mmol/L 4.1 3.8   < > 4.2   CHLORIDE mmol/L 101 105   < > 105   CO2 mmol/L 27 26    < > 24   BUN mg/dL 13 16   < > 12   CREATININE mg/dL 0.81 1.03   < > 0.91   GLUCOSE RANDOM mg/dL 102* 94   < > 95   CALCIUM mg/dL 8.6 9.3   < > 8.4*   XMAGNESIUM mg/dL 1.9  --    < > 1.7   PHOSPHORUS mg/dL  --   --   --  3.7   AST U/L  --  12   < > 99*   ALT U/L  --  12   < > 139*   ALK PHOS U/L  --  130*   < > 154*   EGFR  103 85   < > 99    < > = values in this interval not displayed.       Current Medications   Medications reviewed and updated in facility chart.

## 2024-07-31 ENCOUNTER — NURSING HOME VISIT (OUTPATIENT)
Dept: GERIATRICS | Facility: OTHER | Age: 59
End: 2024-07-31
Payer: COMMERCIAL

## 2024-07-31 VITALS
DIASTOLIC BLOOD PRESSURE: 60 MMHG | TEMPERATURE: 98 F | RESPIRATION RATE: 18 BRPM | HEART RATE: 63 BPM | SYSTOLIC BLOOD PRESSURE: 104 MMHG | WEIGHT: 185.3 LBS

## 2024-07-31 DIAGNOSIS — Z89.512 HISTORY OF LEFT BELOW KNEE AMPUTATION (HCC): ICD-10-CM

## 2024-07-31 DIAGNOSIS — I73.9 PERIPHERAL ARTERIAL DISEASE (HCC): Primary | ICD-10-CM

## 2024-07-31 DIAGNOSIS — G54.6 PHANTOM PAIN FOLLOWING AMPUTATION OF LOWER LIMB (HCC): ICD-10-CM

## 2024-07-31 DIAGNOSIS — Z66 DNR (DO NOT RESUSCITATE): ICD-10-CM

## 2024-07-31 PROBLEM — L03.019 PARONYCHIA OF FINGER: Status: RESOLVED | Noted: 2024-07-19 | Resolved: 2024-07-31

## 2024-07-31 PROCEDURE — 99309 SBSQ NF CARE MODERATE MDM 30: CPT | Performed by: PHYSICIAN ASSISTANT

## 2024-07-31 NOTE — PROGRESS NOTES
[facility]  [address] [address]  [phone]  [facility]  Code 31      NAME: Todd Spaulding  AGE: 58 y.o. SEX: male [phone]    DATE OF ENCOUNTER: 7/31/2024    CODE STATUS: DNR    Assessment and Plan     Problem List Items Addressed This Visit          Cardiovascular and Mediastinum    Peripheral arterial disease (HCC) - Primary     Continue ASA, plavix, statin            Care Coordination    DNR (do not resuscitate)    History of left below knee amputation (HCC)     BKA 6/5/24  Remains in rehab for now until he is fitted with prosthesis because he is homeless and needs prosthesis for mobilization  Has been cleared by vascular surgery to be seen by physiatry to start preparing for prosthesis fitting            Surgery/Wound/Pain    Phantom pain following amputation of lower limb (HCC)       No orders of the defined types were placed in this encounter.          Chief Complaint     Chief Complaint   Patient presents with    Geriatric Evaluation     Follow up       History of Present Illness   58 year old male being seen today for follow up for chronic conditions. He is admitted to [facility] for rehab after his recent BKA. He remains in rehab facility now until he is fitted for prosthesis due to his prior status of homelessness.          The following portions of the patient's history were reviewed and updated as appropriate: allergies, current medications, past family history, past medical history, past social history, past surgical history and problem list.    Review of Systems   Review of Systems   Constitutional: Negative.    HENT: Negative.     Eyes: Negative.    Respiratory: Negative.     Cardiovascular: Negative.    Gastrointestinal: Negative.    Endocrine: Negative.    Genitourinary: Negative.    Musculoskeletal:  Positive for gait problem.   Hematological: Negative.    Psychiatric/Behavioral: Negative.            Active Problem List     Patient  Active Problem List   Diagnosis    Peripheral arterial disease (HCC)    Renovascular hypertension    Hyperlipidemia    Renal artery stenosis, native, bilateral (HCC)    History of CVA with residual deficit    History of tobacco use disorder    DNR (do not resuscitate)    History of testicular cancer    History of left below knee amputation (HCC)    Phantom pain following amputation of lower limb (HCC)    History of substance abuse (HCC)    Sheltered homelessness         Objective     /60   Pulse 63   Temp 98 °F (36.7 °C)   Resp 18   Wt 84.1 kg (185 lb 4.8 oz)     Physical Exam  Vitals reviewed.   Constitutional:       General: He is not in acute distress.     Appearance: He is not ill-appearing or diaphoretic.   HENT:      Head: Normocephalic and atraumatic.      Nose: Nose normal.      Mouth/Throat:      Pharynx: Oropharynx is clear.   Cardiovascular:      Rate and Rhythm: Normal rate.   Pulmonary:      Effort: Pulmonary effort is normal. No respiratory distress.      Breath sounds: Normal breath sounds.   Abdominal:      General: Bowel sounds are normal. There is no distension.      Palpations: Abdomen is soft.      Tenderness: There is no abdominal tenderness.   Musculoskeletal:      Comments: Left BKA   Skin:     General: Skin is warm.      Comments: BKA incision well healed. He does have a chronic black discolored area over his left knee and a chronic wound under the knee as well which is followed by wound care   Neurological:      Mental Status: He is alert and oriented to person, place, and time.         Pertinent Laboratory/Diagnostic Studies:  CBC:   Lab Results   Component Value Date/Time    WBC 12.90 (H) 08/11/2015 02:35 PM    RBC 5.16 08/11/2015 02:35 PM    HGB 8.6 (L) 04/02/2024 12:05 PM    HCT 17.1 (L) 03/28/2024 06:22 PM    MCV 90 08/11/2015 02:35 PM    MCH 30.0 08/11/2015 02:35 PM    MCHC 33.4 08/11/2015 02:35 PM    RDW 14.6 08/11/2015 02:35 PM    MPV 10.5 08/11/2015 02:35 PM      08/11/2015 02:35 PM    NEUTOPHILPCT 78 (H) 08/11/2015 02:35 PM    LYMPHOPCT 13 (L) 08/11/2015 02:35 PM    MONOPCT 7 08/11/2015 02:35 PM    EOSPCT 2 08/11/2015 02:35 PM    NEUTROABS 10.01 (H) 08/11/2015 02:35 PM    LYMPHSABS 1.72 08/11/2015 02:35 PM    MONOSABS 0.93 08/11/2015 02:35 PM    EOSABS 0.19 08/11/2015 02:35 PM     Chemistry Profile:   Lab Results   Component Value Date/Time     08/11/2015 02:35 PM    K 4.1 06/06/2024 04:33 AM     06/06/2024 04:33 AM    CO2 27 06/06/2024 04:33 AM    ANIONGAP 12 08/11/2015 02:35 PM    BUN 13 06/06/2024 04:33 AM    CREATININE 0.81 06/06/2024 04:33 AM    GLUC 102 (H) 06/06/2024 04:33 AM    GLUCOSE 134 08/11/2015 02:35 PM    CALCIUM 8.6 06/06/2024 04:33 AM    MG 1.9 06/06/2024 04:33 AM    PHOS 3.7 03/29/2024 03:27 AM    AST 12 06/05/2024 12:30 PM    ALT 12 06/05/2024 12:30 PM    ALKPHOS 130 (H) 06/05/2024 12:30 PM    PROT 7.1 08/11/2015 02:35 PM    BILITOT 0.48 08/11/2015 02:35 PM    EGFR 103 06/06/2024 04:33 AM    EGFR 98 08/08/2020 04:36 AM     Repeat labs ordered for tomorrow AM    Current Medications   Medications reviewed and updated in facility chart.

## 2024-07-31 NOTE — ASSESSMENT & PLAN NOTE
Continue oxy, gabapentin, robaxin  Consult previously placed to [facility] pain management as he will need ongoing pain management follow up after he is discharged from rehab facility

## 2024-08-08 ENCOUNTER — NURSING HOME VISIT (OUTPATIENT)
Dept: GERIATRICS | Facility: OTHER | Age: 59
End: 2024-08-08
Payer: COMMERCIAL

## 2024-08-08 DIAGNOSIS — Z59.01 SHELTERED HOMELESSNESS: ICD-10-CM

## 2024-08-08 DIAGNOSIS — I69.30 HISTORY OF CVA WITH RESIDUAL DEFICIT: ICD-10-CM

## 2024-08-08 DIAGNOSIS — Z89.512 HISTORY OF LEFT BELOW KNEE AMPUTATION (HCC): Primary | ICD-10-CM

## 2024-08-08 DIAGNOSIS — G54.6 PHANTOM PAIN FOLLOWING AMPUTATION OF LOWER LIMB (HCC): ICD-10-CM

## 2024-08-08 DIAGNOSIS — I73.9 PERIPHERAL ARTERIAL DISEASE (HCC): ICD-10-CM

## 2024-08-08 PROCEDURE — 99309 SBSQ NF CARE MODERATE MDM 30: CPT

## 2024-08-08 SDOH — ECONOMIC STABILITY - HOUSING INSECURITY: SHELTERED HOMELESSNESS: Z59.01

## 2024-08-08 NOTE — PROGRESS NOTES
[facility]   [address]  [place] [address]  Facility: [facility]  [phone]    SNF Rehab: POS 31    ASSESSMENT AND PLAN:    1. History of left below knee amputation (HCC)  Assessment & Plan:  Left BKA 24  Remains in rehab until he is fitted with prosthesis because he is homeless and needs prosthesis for mobilization  Has been cleared by vascular surgery to be seen by physiatry to start preparing for prosthesis fitting  Patient mostly complains of pain after being in 1 position for a while or at nighttime  Before pain medication patient notes pain is a 10/10, pain comes down to a 5/10 after oxycodone 10 mg  Does have some phantom limb pain intermittently  Continue PT/OT  Patient mobilizing with crutches, rolling walker, and wheelchair  Patient is pretty independent in the room  Gets fatigued after long walks down the hallway, but recovers quickly  2. Peripheral arterial disease (HCC)  Assessment & Plan:  Continue ASA, plavix, statin  Follow up with vascular service after discharge  3. Phantom pain following amputation of lower limb (HCC)  Assessment & Plan:  Continue oxy, gabapentin, robaxin  Consult previously placed to [facility] pain management as he will need ongoing pain management follow up after he is discharged from rehab facility  Pain 2/10 on exam  4. History of CVA with residual deficit  Assessment & Plan:  Continue secondary stroke prevention with aspirin, clopidogrel, and rosuvastatin  5. Sheltered homelessness  Assessment & Plan:  Previously living in homeless shelter  Case management following for discharge planning       Name: Todd Spaulding                 : 1965               Sex: male    HPI:  Patient evaluated today in SNF rehab to follow-up on acute and chronic medical conditions. Upon examination, patient is awake, alert and in no acute distress. Patient is sitting at the edge of the bed with no  complaints. Refer to assessment and plan for further HPI. The following portions of the patient's history were reviewed and updated as appropriate: allergies, current medications, past family history, past medical history, past social history, past surgical history and problem list.    ROS: Review of Systems   Constitutional: Negative.    HENT: Negative.     Eyes: Negative.    Respiratory: Negative.     Cardiovascular: Negative.    Gastrointestinal: Negative.    Endocrine: Negative.    Genitourinary: Negative.    Musculoskeletal:  Positive for gait problem.   Hematological: Negative.    Psychiatric/Behavioral: Negative.         Allergies   Allergen Reactions   • Lipitor [Atorvastatin] Rash       Medications:    Current Outpatient Medications on File Prior to Visit   Medication Sig Dispense Refill   • allopurinol (ZYLOPRIM) 100 mg tablet Take 100 mg by mouth daily     • aspirin 81 mg chewable tablet Chew 81 mg daily     • Cholecalciferol (Vitamin D-3) 125 MCG (5000 UT) TABS Take 1 tablet by mouth in the morning     • clopidogrel (PLAVIX) 75 mg tablet Take 75 mg by mouth daily     • gabapentin (NEURONTIN) 300 mg capsule Take 400 mg by mouth 3 (three) times a day     • lisinopril (ZESTRIL) 10 mg tablet Take 5 mg by mouth daily     • methocarbamol (ROBAXIN) 500 mg tablet Take 500 mg by mouth 3 (three) times a day     • oxyCODONE (ROXICODONE) 10 MG TABS Take 10 mg by mouth every 4 (four) hours as needed for moderate pain or severe pain     • polyethylene glycol (MIRALAX) 17 g packet Take 17 g by mouth daily as needed     • rosuvastatin (CRESTOR) 20 MG tablet Take 20 mg by mouth daily     • senna (SENOKOT) 8.6 MG tablet Take 1 tablet by mouth daily at bedtime     • tamsulosin (FLOMAX) 0.4 mg Take 0.4 mg by mouth daily with dinner       No current facility-administered medications on file prior to visit.       History:  Past Medical History:   Diagnosis Date   • Cancer (HCC)     past hx testicular cancer.   • Paronychia of  finger 2024   • Testicular cancer (HCC)     Left orchiectomy   • TIA (transient ischemic attack)     September     Past Surgical History:   Procedure Laterality Date   • AORTA - BILATERAL FEMORAL ARTERY BYPASS GRAFT  09/15/2023    Dr. Ba   • CATARACT EXTRACTION, BILATERAL     • FEMORAL ARTERY STENT Right 2020   • LEG AMPUTATION THROUGH KNEE Left 2024    Below-knee   • ORCHIECTOMY Left 2000    Status post XRT   • OTHER SURGICAL HISTORY      Surgery for cancer removal   • RENAL ARTERY STENT Bilateral 2023    Bilateral renal artery angioplasty and stenting (Dr. Ba)     Family History   Adopted: Yes     Social History     Socioeconomic History   • Marital status: Single     Spouse name: Not on file   • Number of children: Not on file   • Years of education: Not on file   • Highest education level: Not on file   Occupational History   • Not on file   Tobacco Use   • Smoking status: Former     Current packs/day: 0.00     Types: Cigarettes     Quit date: 2023     Years since quittin.9   • Smokeless tobacco: Not on file   Substance and Sexual Activity   • Alcohol use: Yes   • Drug use: No   • Sexual activity: Not on file   Other Topics Concern   • Not on file   Social History Narrative   • Not on file     Social Determinants of Health     Financial Resource Strain: High Risk (2024)    Received from [facility]    Overall Financial Resource Strain (CARDIA)    • Difficulty of Paying Living Expenses: Hard   Food Insecurity: Food Insecurity Present (2024)    Received from [facility]    Hunger Vital Sign    • Worried About Running Out of Food in the Last Year: Sometimes true    • Ran Out of Food in the Last Year: Never true   Transportation Needs: No Transportation Needs (2024)    Received from [facility]    PRAPARE - Transportation    • Lack of Transportation (Medical): No    • Lack of Transportation (Non-Medical): No    Physical Activity: Not on file   Stress: No Stress Concern Present (12/21/2023)    Received from [facility]    [employer] [place] [employer] - Occupational Stress Questionnaire    • Feeling of Stress : Only a little   Social Connections: Feeling Socially Integrated (4/15/2024)    Received from [facility]    OASIS : Social Isolation    • How often do you feel lonely or isolated from those around you?: Never   Intimate Partner Violence: Not At Risk (6/20/2024)    Received from [facility]    Humiliation, Afraid, Rape, and Kick questionnaire    • Fear of Current or Ex-Partner: No    • Emotionally Abused: No    • Physically Abused: No    • Sexually Abused: No   Housing Stability: High Risk (6/20/2024)    Received from [facility]    Housing Stability Vital Sign    • Unable to Pay for Housing in the Last Year: Yes    • Number of Times Moved in the Last Year: 1    • Homeless in the Last Year: Yes     Past Surgical History:   Procedure Laterality Date   • AORTA - BILATERAL FEMORAL ARTERY BYPASS GRAFT  09/15/2023    Dr. Ba   • CATARACT EXTRACTION, BILATERAL     • FEMORAL ARTERY STENT Right 08/07/2020   • LEG AMPUTATION THROUGH KNEE Left 06/05/2024    Below-knee   • ORCHIECTOMY Left 2000    Status post XRT   • OTHER SURGICAL HISTORY      Surgery for cancer removal   • RENAL ARTERY STENT Bilateral 09/11/2023    Bilateral renal artery angioplasty and stenting (Dr. Ba)       OBJECTIVE:  /64, Temp 98 F, HR 63, RR 18, O2 97%, Wt.185.5 lbs  Physical Exam  Vitals reviewed.   Constitutional:       General: He is awake. He is not in acute distress.     Appearance: He is well-groomed and normal weight. He is not ill-appearing or diaphoretic.   HENT:      Head: Normocephalic and atraumatic.   Cardiovascular:      Rate and Rhythm: Normal rate and regular rhythm.      Heart sounds: Normal heart sounds, S1 normal and S2 normal. No  "murmur heard.     Comments: Intermittent PACs/PVCs on auscultation  Pulmonary:      Effort: Pulmonary effort is normal. No respiratory distress.      Breath sounds: Normal breath sounds.   Abdominal:      General: Bowel sounds are normal. There is no distension.      Palpations: Abdomen is soft.      Tenderness: There is no abdominal tenderness.   Musculoskeletal:      Right lower leg: No edema.      Left lower leg: No edema.      Comments: Left BKA      Left Lower Extremity: Left leg is amputated below knee.   Skin:     General: Skin is warm and dry.      Findings: Wound present.      Comments: Chronic wounds around left knee, followed by wound care   Neurological:      Mental Status: He is alert and oriented to person, place, and time. Mental status is at baseline.   Psychiatric:         Attention and Perception: Attention and perception normal.         Mood and Affect: Mood and affect normal.         Speech: Speech normal.         Behavior: Behavior normal. Behavior is cooperative.         Thought Content: Thought content normal.         Cognition and Memory: Cognition and memory normal.         Judgment: Judgment normal.       Labs & Imaging: All labs and imaging reviewed in EMR and PCC.    Lab Results   Component Value Date    WBC 12.90 (H) 08/11/2015    HGB 8.6 (L) 04/02/2024    HCT 17.1 (L) 03/28/2024    MCV 90 08/11/2015     08/11/2015     Lab Results   Component Value Date    SODIUM 135 06/06/2024    K 4.1 06/06/2024     06/06/2024    CO2 27 06/06/2024    BUN 13 06/06/2024    CREATININE 0.81 06/06/2024    GLUC 102 (H) 06/06/2024    CALCIUM 8.6 06/06/2024     No results found for: \"IZEGOMZJ19\"  No results found for: \"GJL4ZTKIOZKA\", \"TSH\"  No results found for: \"PTQI76JPVSCI\", \"TYBV37YIDFIL\"     FELICIA Lawton  Geriatric Medicine  8/8/24  "

## 2024-08-08 NOTE — ASSESSMENT & PLAN NOTE
Left BKA 6/5/24  Remains in rehab until he is fitted with prosthesis because he is homeless and needs prosthesis for mobilization  Has been cleared by vascular surgery to be seen by physiatry to start preparing for prosthesis fitting  Patient mostly complains of pain after being in 1 position for a while or at nighttime  Before pain medication patient notes pain is a 10/10, pain comes down to a 5/10 after oxycodone 10 mg  Does have some phantom limb pain intermittently  Continue PT/OT  Patient mobilizing with crutches, rolling walker, and wheelchair  Patient is pretty independent in the room  Gets fatigued after long walks down the hallway, but recovers quickly

## 2024-08-08 NOTE — ASSESSMENT & PLAN NOTE
Continue oxy, gabapentin, robaxin  Consult previously placed to [facility] pain management as he will need ongoing pain management follow up after he is discharged from rehab facility  Pain 2/10 on exam

## 2024-08-16 ENCOUNTER — NURSING HOME VISIT (OUTPATIENT)
Dept: GERIATRICS | Facility: OTHER | Age: 59
End: 2024-08-16
Payer: COMMERCIAL

## 2024-08-16 DIAGNOSIS — M1A.9XX0 CHRONIC GOUT WITHOUT TOPHUS, UNSPECIFIED CAUSE, UNSPECIFIED SITE: ICD-10-CM

## 2024-08-16 DIAGNOSIS — R54 FRAILTY: ICD-10-CM

## 2024-08-16 DIAGNOSIS — E55.9 VITAMIN D DEFICIENCY: ICD-10-CM

## 2024-08-16 DIAGNOSIS — Z89.512 HISTORY OF LEFT BELOW KNEE AMPUTATION (HCC): ICD-10-CM

## 2024-08-16 DIAGNOSIS — I70.1 RENAL ARTERY STENOSIS, NATIVE, BILATERAL (HCC): ICD-10-CM

## 2024-08-16 DIAGNOSIS — I15.0 RENOVASCULAR HYPERTENSION: ICD-10-CM

## 2024-08-16 DIAGNOSIS — Z66 DNR (DO NOT RESUSCITATE): ICD-10-CM

## 2024-08-16 DIAGNOSIS — R73.03 PREDIABETES: ICD-10-CM

## 2024-08-16 DIAGNOSIS — I69.30 HISTORY OF CVA WITH RESIDUAL DEFICIT: ICD-10-CM

## 2024-08-16 DIAGNOSIS — I73.9 PERIPHERAL ARTERIAL DISEASE (HCC): Primary | ICD-10-CM

## 2024-08-16 DIAGNOSIS — E78.5 HYPERLIPIDEMIA, UNSPECIFIED HYPERLIPIDEMIA TYPE: ICD-10-CM

## 2024-08-16 DIAGNOSIS — D64.9 POSTOPERATIVE ANEMIA: ICD-10-CM

## 2024-08-16 DIAGNOSIS — G54.6 PHANTOM PAIN FOLLOWING AMPUTATION OF LOWER LIMB (HCC): ICD-10-CM

## 2024-08-16 PROCEDURE — 99310 SBSQ NF CARE HIGH MDM 45: CPT | Performed by: INTERNAL MEDICINE

## 2024-08-16 NOTE — PROGRESS NOTES
[facility]  [address]  [place] [address]  [facility]  [facility]  32  Follow-up visit    NAME: Todd Spaulding  AGE: 58 y.o. SEX: male    DATE OF ENCOUNTER: August 16, 2024.    Code status:   DNR    Assessment and Plan     1. Peripheral arterial disease (HCC)  Assessment & Plan:  Will continue his care in collaboration with his [facility] vascular surgery service  Will continue prior to admission aspirin, clopidogrel, and rosuvastatin  Will continue with monitoring for changes condition  2. Renovascular hypertension  Assessment & Plan:  Status post bilateral renal artery stenting  Will discontinue his lisinopril secondary to soft blood pressure readings and to avoid hypotension  Will continue with monitoring for changes condition  3. Hyperlipidemia, unspecified hyperlipidemia type  Assessment & Plan:  December 8, 2023: Fasting lipid profile: Total cholesterol 279, triglycerides 109, HDL 50,   Will continue his prior to admission rosuvastatin  Will continue monitoring for change in his condition  4. Frailty  Assessment & Plan:  Secondary to his acute on chronic medical conditions  He continues to require 24/7 care/support of his ADLs  I recommend continued care/support of his ADLs as a resident at the nursing facility  Will continue to monitor for change in his condition  5. Prediabetes  Assessment & Plan:  July 14, 2020: Hemoglobin A1c: 6.2%,   Will update his hemoglobin A1c level  Further recommendations, pending results  6. Postoperative anemia  Assessment & Plan:  June 5, 2024: H/H/MCV: 13.1/39.2/87  June 5, 2024: Left BKA surgery  August 1, 2024: H/H/MCV 11.4/35.3/84  He is asymptomatic  Will continue with nutritional support and periodic laboratory monitoring for change in his condition  7. Chronic gout without tophus, unspecified cause, unspecified site  Assessment & Plan:  Will continue his prior to admission  allopurinol  Will check a uric acid level  Will continue with monitoring for change in his condition  8. History of CVA with residual deficit  Assessment & Plan:  Will continue his prior to admission aspirin, clopidogrel, and rosuvastatin  Will continue with monitoring for changes condition  9. History of left below knee amputation (HCC)  Assessment & Plan:  Secondary to peripheral arterial disease  Will continue his care in collaboration with his vascular surgery service  He is scheduled to see physiatry for prosthesis  Will continue with monitoring for change in his condition  10. Phantom pain following amputation of lower limb (HCC)  Assessment & Plan:  He reports that his symptoms are present but under control with current dosage of gabapentin and as needed oxycodone  He endorses trying to limit his use of oxycodone  He will be seeing [facility] physiatry  Will continue with monitoring for change in his condition  11. Renal artery stenosis, native, bilateral ([facility])  Assessment & Plan:  September 11, 2023: Bilateral renal artery angioplasty with stenting (Dr. Ba)  Will continue his care in collaboration with his [facility] vascular surgery service  Will continue his prior to admission aspirin, clopidogrel, and rosuvastatin  Will continue with monitoring for changes condition  12. Vitamin D deficiency  Assessment & Plan:  September 7, 2023: Vitamin D 25-hydroxy level: 9  Will continue his prior to admission oral vitamin D3 supplement  Will continue with periodic laboratory monitoring for changes condition  13. DNR (do not resuscitate)    See my history and physical note of June 21, 2024 for further information.    All medications and routine orders were reviewed and updated as needed.    Plan discussed with: Patient and nursing staff.    I have spent a total time of 70 minutes in caring for this patient on the day of the visit/encounter including Diagnostic results, Instructions for management, Risk factor reductions,  Impressions, Counseling / Coordination of care, Documenting in the medical record, Reviewing / ordering tests, medicine, procedures  , Obtaining or reviewing history  , and Communicating with other healthcare professionals .     Chief Complaint     He is seen for a follow-up visit to update the care and treatment of his chronic medical conditions.    History of Present Illness     He is a 58-year-old gentleman who is seen for a follow-up visit to update the care and treatment of his chronic medical conditions, including peripheral arterial disease status post left BKA, renovascular hypertension with bilateral renal artery stenting, history of CVA, phantom limb pain, vitamin D deficiency, and hyperlipidemia.    He denies feeling lightheaded.  He reports having a bowel movement every other day.  He does not feel constipated.  He denies chest pain and shortness of breath.  He reports occasional phantom limb discomfort (feels pain on the top of his left foot).  He reports trying to limit his use of oxycodone.    He reports being told that he had a history of gout.    The following portions of the patient's history were reviewed and updated as appropriate: current medications, past family history, past medical history, past social history, past surgical history and problem list.    Allergies:  Allergies   Allergen Reactions    Lipitor [Atorvastatin] Rash       Review of Systems     Review of Systems   Respiratory:  Negative for shortness of breath.    Cardiovascular:  Negative for chest pain.   Gastrointestinal:  Negative for constipation.   Musculoskeletal:         See HPI       Medications and orders     All medications reviewed and updated in care home EMR.      Objective     Vitals: Recent vitals: Weight 189 pounds, pulse 65, blood pressure 102/55.    Physical Exam  Vitals reviewed.   Constitutional:       General: He is awake. He is not in acute distress.     Appearance: He is well-developed. He is not  "toxic-appearing or diaphoretic.      Comments: He appears comfortable sitting on the edge of his bed, stated age, and healthy.   Cardiovascular:      Rate and Rhythm: Normal rate and regular rhythm.      Heart sounds: Normal heart sounds. No murmur heard.     No friction rub. No gallop.   Pulmonary:      Effort: Pulmonary effort is normal. No respiratory distress.      Breath sounds: Normal breath sounds. No stridor. No wheezing, rhonchi or rales.   Musculoskeletal:      Right lower leg: No edema.   Skin:     Comments: Dressing in place on left leg stump   Neurological:      Mental Status: He is alert.   Psychiatric:         Mood and Affect: Mood normal.         Behavior: Behavior normal. Behavior is cooperative.       Pertinent Laboratory/Diagnostic Studies:     The following labs were reviewed please see chart or hospital paperwork for details.    July 14, 2020:    Hemoglobin A1c: 6.2%,     September 7, 2023:    Vitamin D 25-hydroxy level: 9    December 8, 2023:    Fasting lipid profile: Total cholesterol 279, triglycerides 109, HDL 50,     June 5, 2024:    H/H/MCV: 13.1/39.2/87    August 1, 2024:    CBC with differential: WBC count 6.5, hemoglobin 11.4, hematocrit 35.3, platelet count 238,000, MCV 84    BMP: Sodium 140, potassium 4.5, BUN 20, creatinine 0.98, fasting blood sugar 96, EGFR 90    - His order summary was reviewed and signed.  New orders reviewed with nursing staff.      Portions of the record may have been created with voice recognition software.  Occasional wrong word or \"sound a like\" substitutions may have occurred due to the inherent limitations of voice recognition software.  Read the chart carefully and recognize, using context, where substitutions have occurred.    Duong Iqbal M.D.  8/18/2024 11:40 AM      "

## 2024-08-18 PROBLEM — D64.9 POSTOPERATIVE ANEMIA: Status: ACTIVE | Noted: 2024-08-16

## 2024-08-18 PROBLEM — R54 FRAILTY: Status: ACTIVE | Noted: 2024-08-18

## 2024-08-18 PROBLEM — R73.03 PREDIABETES: Status: ACTIVE | Noted: 2024-08-18

## 2024-08-18 PROBLEM — M10.9 GOUT: Status: ACTIVE | Noted: 2024-08-18

## 2024-08-18 PROBLEM — E55.9 VITAMIN D DEFICIENCY: Status: ACTIVE | Noted: 2024-08-18

## 2024-08-18 NOTE — ASSESSMENT & PLAN NOTE
June 5, 2024: H/H/MCV: 13.1/39.2/87  June 5, 2024: Left BKA surgery  August 1, 2024: H/H/MCV 11.4/35.3/84  He is asymptomatic  Will continue with nutritional support and periodic laboratory monitoring for change in his condition

## 2024-08-18 NOTE — ASSESSMENT & PLAN NOTE
July 14, 2020: Hemoglobin A1c: 6.2%,   Will update his hemoglobin A1c level  Further recommendations, pending results

## 2024-08-18 NOTE — ASSESSMENT & PLAN NOTE
December 8, 2023: Fasting lipid profile: Total cholesterol 279, triglycerides 109, HDL 50,   Will continue his prior to admission rosuvastatin  Will continue monitoring for change in his condition

## 2024-08-18 NOTE — ASSESSMENT & PLAN NOTE
Will continue his prior to admission aspirin, clopidogrel, and rosuvastatin  Will continue with monitoring for changes condition

## 2024-08-18 NOTE — ASSESSMENT & PLAN NOTE
Secondary to peripheral arterial disease  Will continue his care in collaboration with his vascular surgery service  He is scheduled to see physiatry for prosthesis  Will continue with monitoring for change in his condition

## 2024-08-18 NOTE — ASSESSMENT & PLAN NOTE
September 11, 2023: Bilateral renal artery angioplasty with stenting (Dr. Ba)  Will continue his care in collaboration with his [facility] vascular surgery service  Will continue his prior to admission aspirin, clopidogrel, and rosuvastatin  Will continue with monitoring for changes condition

## 2024-08-18 NOTE — ASSESSMENT & PLAN NOTE
Will continue his prior to admission allopurinol  Will check a uric acid level  Will continue with monitoring for change in his condition

## 2024-08-18 NOTE — ASSESSMENT & PLAN NOTE
Will continue his care in collaboration with his [facility] vascular surgery service  Will continue prior to admission aspirin, clopidogrel, and rosuvastatin  Will continue with monitoring for changes condition

## 2024-08-18 NOTE — ASSESSMENT & PLAN NOTE
Status post bilateral renal artery stenting  Will discontinue his lisinopril secondary to soft blood pressure readings and to avoid hypotension  Will continue with monitoring for changes condition

## 2024-08-18 NOTE — ASSESSMENT & PLAN NOTE
Secondary to his acute on chronic medical conditions  He continues to require 24/7 care/support of his ADLs  I recommend continued care/support of his ADLs as a resident at the nursing facility  Will continue to monitor for change in his condition

## 2024-08-18 NOTE — ASSESSMENT & PLAN NOTE
He reports that his symptoms are present but under control with current dosage of gabapentin and as needed oxycodone  He endorses trying to limit his use of oxycodone  He will be seeing [facility] physiatry  Will continue with monitoring for change in his condition

## 2024-08-18 NOTE — ASSESSMENT & PLAN NOTE
September 7, 2023: Vitamin D 25-hydroxy level: 9  Will continue his prior to admission oral vitamin D3 supplement  Will continue with periodic laboratory monitoring for changes condition

## 2024-08-31 ENCOUNTER — TELEPHONE (OUTPATIENT)
Dept: OTHER | Facility: OTHER | Age: 59
End: 2024-08-31

## 2024-08-31 NOTE — TELEPHONE ENCOUNTER
Viet from [facility] called reporting resident had a witnessed fall in the shower. There were no injuries. He has a BKA and has a skin tear at the incision site. Otherwise he is okay    On lor notified via [facility] chat

## 2024-09-04 ENCOUNTER — NURSING HOME VISIT (OUTPATIENT)
Dept: GERIATRICS | Facility: OTHER | Age: 59
End: 2024-09-04
Payer: COMMERCIAL

## 2024-09-04 DIAGNOSIS — Z89.512 HISTORY OF LEFT BELOW KNEE AMPUTATION (HCC): Primary | ICD-10-CM

## 2024-09-04 DIAGNOSIS — I73.9 PERIPHERAL ARTERIAL DISEASE (HCC): ICD-10-CM
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
## 2024-09-04 DIAGNOSIS — G54.6 PHANTOM PAIN FOLLOWING AMPUTATION OF LOWER LIMB (HCC): ICD-10-CM

## 2024-09-04 PROCEDURE — 99309 SBSQ NF CARE MODERATE MDM 30: CPT

## 2024-09-04 NOTE — PROGRESS NOTES
[facility]   [address]  [place] [address]  Facility: [facility]  [phone]    SNF Rehab: POS 31    ASSESSMENT AND PLAN:    1. History of left below knee amputation (HCC)  Assessment & Plan:  Secondary to peripheral arterial disease  Following with vascular surgery service  Plan to see physiatry for prosthesis  Pain is controlled  Patient prefers to take oxycodone and Tylenol together, will order Percocet every 6 hours as needed and decrease dose of oxycodone  Patient agreed to plan, will reassess if needed  Continue to monitor for acute changes in patient condition  2. Fall, initial encounter  Assessment & Plan:  Recent fall in the bathroom with no injuries, only small skin tear on left stump  Patient lost balance on crutches and was unable to catch himself  Continue PT/OT, plan to be fit for prosthesis  Fall and safety precautions  3. Phantom pain following amputation of lower limb (HCC)  Assessment & Plan:  Continue gabapentin and will start Percocet and discontinue oxycodone  Plan to see [facility] physiatry  Will continue with monitoring for change in his condition  4. Peripheral arterial disease (HCC)  Assessment & Plan:  Continue rosuvastatin, Plavix, aspirin  Collaborate care with [facility] vascular surgery  Continue to monitor for acute changes in patient condition      Name: Todd Spaulding                 : 1965               Sex: male    HPI:  Patient evaluated today in SNF rehab to follow-up on acute and chronic medical conditions. Upon examination, patient is awake, alert and in no acute distress. Patient and nursing staff have no concerns at this time. He is doing well and pain is controlled. Refer to assessment and plan for further HPI. The following portions of the patient's history were reviewed and updated as appropriate: allergies, current medications, past family history, past medical history, past social history,  past surgical history and problem list.    ROS: Review of Systems   Constitutional: Negative.    HENT: Negative.     Eyes: Negative.    Respiratory: Negative.     Cardiovascular: Negative.    Gastrointestinal: Negative.    Endocrine: Negative.    Genitourinary: Negative.    Musculoskeletal:  Positive for back pain and gait problem.   Hematological: Negative.    Psychiatric/Behavioral: Negative.         Allergies   Allergen Reactions   • Lipitor [Atorvastatin] Rash       Medications:    Current Outpatient Medications on File Prior to Visit   Medication Sig Dispense Refill   • oxyCODONE-acetaminophen (PERCOCET) 5-325 mg per tablet Take 1 tablet by mouth every 6 (six) hours as needed for moderate pain or severe pain     • allopurinol (ZYLOPRIM) 100 mg tablet Take 100 mg by mouth daily     • aspirin 81 mg chewable tablet Chew 81 mg daily     • Cholecalciferol (Vitamin D-3) 125 MCG (5000 UT) TABS Take 1 tablet by mouth in the morning     • clopidogrel (PLAVIX) 75 mg tablet Take 75 mg by mouth daily     • gabapentin (NEURONTIN) 300 mg capsule Take 400 mg by mouth 3 (three) times a day     • polyethylene glycol (MIRALAX) 17 g packet Take 17 g by mouth daily as needed     • rosuvastatin (CRESTOR) 20 MG tablet Take 20 mg by mouth daily     • senna (SENOKOT) 8.6 MG tablet Take 1 tablet by mouth daily at bedtime     • tamsulosin (FLOMAX) 0.4 mg Take 0.4 mg by mouth daily with dinner       No current facility-administered medications on file prior to visit.       History:  Past Medical History:   Diagnosis Date   • Cancer (HCC)     past hx testicular cancer.   • Paronychia of finger 07/19/2024   • Testicular cancer (HCC)     Left orchiectomy   • TIA (transient ischemic attack)     September     Past Surgical History:   Procedure Laterality Date   • AORTA - BILATERAL FEMORAL ARTERY BYPASS GRAFT  09/15/2023    Dr. Ba   • CATARACT EXTRACTION, BILATERAL     • FEMORAL ARTERY STENT Right 08/07/2020   • LEG AMPUTATION THROUGH  KNEE Left 2024    Below-knee   • ORCHIECTOMY Left     Status post XRT   • OTHER SURGICAL HISTORY      Surgery for cancer removal   • RENAL ARTERY STENT Bilateral 2023    Bilateral renal artery angioplasty and stenting (Dr. Ba)     Family History   Adopted: Yes     Social History     Socioeconomic History   • Marital status: Single     Spouse name: Not on file   • Number of children: Not on file   • Years of education: Not on file   • Highest education level: Not on file   Occupational History   • Not on file   Tobacco Use   • Smoking status: Former     Current packs/day: 0.00     Types: Cigarettes     Quit date: 2023     Years since quittin.0   • Smokeless tobacco: Not on file   Substance and Sexual Activity   • Alcohol use: Yes   • Drug use: No   • Sexual activity: Not on file   Other Topics Concern   • Not on file   Social History Narrative   • Not on file     Social Determinants of Health     Financial Resource Strain: High Risk (2024)    Received from [facility]    Overall Financial Resource Strain (CARDIA)    • Difficulty of Paying Living Expenses: Hard   Food Insecurity: Food Insecurity Present (2024)    Received from [facility]    Hunger Vital Sign    • Worried About Running Out of Food in the Last Year: Sometimes true    • Ran Out of Food in the Last Year: Never true   Transportation Needs: No Transportation Needs (2024)    Received from [facility]    PRAPARE - Transportation    • Lack of Transportation (Medical): No    • Lack of Transportation (Non-Medical): No   Physical Activity: Not on file   Stress: No Stress Concern Present (2023)    Received from [facility]    [employer] [place] [employer] - Occupational Stress Questionnaire    • Feeling of Stress : Only a little   Social Connections: Feeling Socially Integrated (4/15/2024)    Received from [facility]     OASIS : Social Isolation    • How often do you feel lonely or isolated from those around you?: Never   Intimate Partner Violence: Not At Risk (6/20/2024)    Received from [facility]    Humiliation, Afraid, Rape, and Kick questionnaire    • Fear of Current or Ex-Partner: No    • Emotionally Abused: No    • Physically Abused: No    • Sexually Abused: No   Housing Stability: High Risk (6/20/2024)    Received from [facility]    Housing Stability Vital Sign    • Unable to Pay for Housing in the Last Year: Yes    • Number of Times Moved in the Last Year: 1    • Homeless in the Last Year: Yes     Past Surgical History:   Procedure Laterality Date   • AORTA - BILATERAL FEMORAL ARTERY BYPASS GRAFT  09/15/2023    Dr. Ba   • CATARACT EXTRACTION, BILATERAL     • FEMORAL ARTERY STENT Right 08/07/2020   • LEG AMPUTATION THROUGH KNEE Left 06/05/2024    Below-knee   • ORCHIECTOMY Left 2000    Status post XRT   • OTHER SURGICAL HISTORY      Surgery for cancer removal   • RENAL ARTERY STENT Bilateral 09/11/2023    Bilateral renal artery angioplasty and stenting (Dr. Ba)       OBJECTIVE:  Wt. 197.8 lbs, /66, Temp 98 F, HR 78, RR 18, O2 99%  Physical Exam  Vitals reviewed.   Constitutional:       General: He is awake. He is not in acute distress.     Appearance: He is well-groomed and normal weight. He is not ill-appearing or diaphoretic.   HENT:      Head: Normocephalic and atraumatic.   Cardiovascular:      Rate and Rhythm: Normal rate and regular rhythm.      Heart sounds: Normal heart sounds, S1 normal and S2 normal. No murmur heard.  Pulmonary:      Effort: Pulmonary effort is normal. No respiratory distress.      Breath sounds: Normal breath sounds.   Abdominal:      General: Bowel sounds are normal. There is no distension.      Palpations: Abdomen is soft.      Tenderness: There is no abdominal tenderness.   Musculoskeletal:      Right lower leg: No edema.      Left  "lower leg: No edema.      Comments: Left BKA      Left Lower Extremity: Left leg is amputated below knee.   Skin:     General: Skin is warm and dry.      Findings: Wound present.      Comments: Chronic wounds around left knee, followed by wound care   Neurological:      Mental Status: He is alert and oriented to person, place, and time. Mental status is at baseline.   Psychiatric:         Attention and Perception: Attention and perception normal.         Mood and Affect: Mood and affect normal.         Speech: Speech normal.         Behavior: Behavior normal. Behavior is cooperative.         Thought Content: Thought content normal.         Cognition and Memory: Cognition and memory normal.         Judgment: Judgment normal.         Labs & Imaging: All labs and imaging reviewed in EMR and PCC.  8/20/24:  Hbg 11.5  Hct 34.9  Wbc 6.8  Plt 202  Vit D, 25-OH 51  TSH 2.37  HA1C 5.7    Lab Results   Component Value Date    WBC 12.90 (H) 08/11/2015    HGB 8.6 (L) 04/02/2024    HCT 17.1 (L) 03/28/2024    MCV 90 08/11/2015     08/11/2015     Lab Results   Component Value Date    SODIUM 135 06/06/2024    K 4.1 06/06/2024     06/06/2024    CO2 27 06/06/2024    BUN 13 06/06/2024    CREATININE 0.81 06/06/2024    GLUC 102 (H) 06/06/2024    CALCIUM 8.6 06/06/2024     No results found for: \"WMSGEPUF17\"  No results found for: \"THP4YMCHPAVV\", \"TSH\"  No results found for: \"XURV35XHQYCG\", \"YAEZ48SOGBJV\"     Plan:  -Oxycodone-Acetaminophen 5-325 mg Q 6 hrs PRN    FELICIA Lawton  Geriatric Medicine  9/4/24    Voice-recognition software may have been used in the preparation of this document. Occasional wrong word or \"sound-alike\" substitutions may have occurred due to the inherent limitations of voice recognition software. Interpretation should be guided by context.       "

## 2024-09-05 RX ORDER — OXYCODONE AND ACETAMINOPHEN 5; 325 MG/1; MG/1
1 TABLET ORAL EVERY 6 HOURS PRN
COMMUNITY
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
## 2024-09-06 PROBLEM — R29.6 FALLS: Status: ACTIVE | Noted: 2024-09-06

## 2024-09-06 NOTE — ASSESSMENT & PLAN NOTE
Continue rosuvastatin, Plavix, aspirin  Collaborate care with [facility] vascular surgery  Continue to monitor for acute changes in patient condition

## 2024-09-06 NOTE — ASSESSMENT & PLAN NOTE
Secondary to peripheral arterial disease  Following with vascular surgery service  Plan to see physiatry for prosthesis  Pain is controlled  Patient prefers to take oxycodone and Tylenol together, will order Percocet every 6 hours as needed and decrease dose of oxycodone  Patient agreed to plan, will reassess if needed  Continue to monitor for acute changes in patient condition

## 2024-09-06 NOTE — ASSESSMENT & PLAN NOTE
Continue gabapentin and will start Percocet and discontinue oxycodone  Plan to see [facility] physiatry  Will continue with monitoring for change in his condition

## 2024-09-06 NOTE — ASSESSMENT & PLAN NOTE
Recent fall in the bathroom with no injuries, only small skin tear on left stump  Patient lost balance on crutches and was unable to catch himself  Continue PT/OT, plan to be fit for prosthesis  Fall and safety precautions

## 2024-09-17 ENCOUNTER — NURSING HOME VISIT (OUTPATIENT)
Dept: GERIATRICS | Facility: OTHER | Age: 59
End: 2024-09-17
Payer: COMMERCIAL

## 2024-09-17 DIAGNOSIS — I69.30 HISTORY OF CVA WITH RESIDUAL DEFICIT: ICD-10-CM
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
## 2024-09-17 DIAGNOSIS — M1A.9XX0 CHRONIC GOUT WITHOUT TOPHUS, UNSPECIFIED CAUSE, UNSPECIFIED SITE: ICD-10-CM

## 2024-09-17 DIAGNOSIS — E78.5 HYPERLIPIDEMIA, UNSPECIFIED HYPERLIPIDEMIA TYPE: ICD-10-CM

## 2024-09-17 DIAGNOSIS — E55.9 VITAMIN D DEFICIENCY: ICD-10-CM

## 2024-09-17 DIAGNOSIS — I73.9 PERIPHERAL ARTERIAL DISEASE (HCC): ICD-10-CM

## 2024-09-17 DIAGNOSIS — F19.11 HISTORY OF SUBSTANCE ABUSE (HCC): ICD-10-CM

## 2024-09-17 DIAGNOSIS — Z66 DNR (DO NOT RESUSCITATE): ICD-10-CM

## 2024-09-17 DIAGNOSIS — Z89.512 HISTORY OF LEFT BELOW KNEE AMPUTATION (HCC): Primary | ICD-10-CM

## 2024-09-17 DIAGNOSIS — G54.6 PHANTOM PAIN FOLLOWING AMPUTATION OF LOWER LIMB (HCC): ICD-10-CM

## 2024-09-17 PROCEDURE — 99309 SBSQ NF CARE MODERATE MDM 30: CPT

## 2024-09-17 NOTE — ASSESSMENT & PLAN NOTE
Continue daily allopurinol  Most recent uric acid 5.3 on 8/20, stable  Intermittent laboratory monitoring

## 2024-09-17 NOTE — ASSESSMENT & PLAN NOTE
Continue gabapentin and PRN Percocet  Plan to see [facility] physiatry  Will continue with monitoring for change in his condition

## 2024-09-17 NOTE — ASSESSMENT & PLAN NOTE
Patient mostly independent  Continue PT/OT, plan to be fit for prosthesis  Fall and safety precautions

## 2024-09-17 NOTE — ASSESSMENT & PLAN NOTE
Continue weaning down PRN oxycodone  Recent decrease in dose and frequency  Patient tolerating well

## 2024-09-17 NOTE — ASSESSMENT & PLAN NOTE
Secondary to peripheral arterial disease  Following with vascular surgery service  Plan to see physiatry for prosthesis  Pain is controlled with PRN percocet  Continue to monitor for acute changes in patient condition.

## 2024-09-17 NOTE — PROGRESS NOTES
[facility]   [address]  [place] [address]  Facility: [facility]  [phone]    SNF Rehab: POS 31    ASSESSMENT AND PLAN:    1. History of left below knee amputation (HCC)  Assessment & Plan:  Secondary to peripheral arterial disease  Following with vascular surgery service  Plan to see physiatry for prosthesis  Pain is controlled with PRN percocet  Continue to monitor for acute changes in patient condition.  2. Peripheral arterial disease (HCC)  Assessment & Plan:  Continue rosuvastatin, Plavix, aspirin  Collaborate care with [facility] vascular surgery  Continue to monitor for acute changes in patient condition  3. History of substance abuse (HCC)  Assessment & Plan:  Continue weaning down PRN oxycodone  Recent decrease in dose and frequency  Patient tolerating well  4. DNR (do not resuscitate)  5. Phantom pain following amputation of lower limb (HCC)  Assessment & Plan:  Continue gabapentin and PRN Percocet  Plan to see [facility] physiatry  Will continue with monitoring for change in his condition  6. History of CVA with residual deficit  Assessment & Plan:  Continue aspirin Plavix and rosuvastatin  7. Fall, subsequent encounter  Assessment & Plan:  Patient mostly independent  Continue PT/OT, plan to be fit for prosthesis  Fall and safety precautions  8. Hyperlipidemia, unspecified hyperlipidemia type  Assessment & Plan:  Continue rosuvastatin  Intermittent lab monitoring  9. Vitamin D deficiency  Assessment & Plan:  Continue vitamin D supplement  Intermittent laboratory monitoring   10. Chronic gout without tophus, unspecified cause, unspecified site  Assessment & Plan:  Continue daily allopurinol  Most recent uric acid 5.3 on , stable  Intermittent laboratory monitoring      Name: Todd Spaulding                 : 1965               Sex: male    HPI:  Patient evaluated today in SNF rehab to follow-up on acute and chronic  medical conditions. Upon examination, patient is awake, alert and in no acute distress.  The patient is sitting in bed comfortably with no complaints.  States that he has been using the as needed pain medication less often.  Refer to assessment and plan for further HPI. The following portions of the patient's history were reviewed and updated as appropriate: allergies, current medications, past family history, past medical history, past social history, past surgical history and problem list.    ROS: Review of Systems   Constitutional: Negative.    HENT: Negative.     Eyes: Negative.    Respiratory: Negative.     Cardiovascular: Negative.    Gastrointestinal: Negative.    Endocrine: Negative.    Genitourinary: Negative.    Musculoskeletal:  Positive for back pain and gait problem.   Hematological: Negative.    Psychiatric/Behavioral: Negative.         Allergies   Allergen Reactions    Lipitor [Atorvastatin] Rash       Medications:    Current Outpatient Medications on File Prior to Visit   Medication Sig Dispense Refill    allopurinol (ZYLOPRIM) 100 mg tablet Take 100 mg by mouth daily      aspirin 81 mg chewable tablet Chew 81 mg daily      Cholecalciferol (Vitamin D-3) 125 MCG (5000 UT) TABS Take 1 tablet by mouth in the morning      clopidogrel (PLAVIX) 75 mg tablet Take 75 mg by mouth daily      gabapentin (NEURONTIN) 300 mg capsule Take 400 mg by mouth 3 (three) times a day      oxyCODONE-acetaminophen (PERCOCET) 5-325 mg per tablet Take 1 tablet by mouth every 6 (six) hours as needed for moderate pain or severe pain      polyethylene glycol (MIRALAX) 17 g packet Take 17 g by mouth daily as needed      rosuvastatin (CRESTOR) 20 MG tablet Take 20 mg by mouth daily      senna (SENOKOT) 8.6 MG tablet Take 1 tablet by mouth daily at bedtime      tamsulosin (FLOMAX) 0.4 mg Take 0.4 mg by mouth daily with dinner       No current facility-administered medications on file prior to visit.       History:  Past Medical  History:   Diagnosis Date    Cancer (HCC)     past hx testicular cancer.    Paronychia of finger 2024    Testicular cancer (HCC)     Left orchiectomy    TIA (transient ischemic attack)     September     Past Surgical History:   Procedure Laterality Date    AORTA - BILATERAL FEMORAL ARTERY BYPASS GRAFT  09/15/2023    Dr. Ba    CATARACT EXTRACTION, BILATERAL      FEMORAL ARTERY STENT Right 2020    LEG AMPUTATION THROUGH KNEE Left 2024    Below-knee    ORCHIECTOMY Left     Status post XRT    OTHER SURGICAL HISTORY      Surgery for cancer removal    RENAL ARTERY STENT Bilateral 2023    Bilateral renal artery angioplasty and stenting (Dr. Ba)     Family History   Adopted: Yes     Social History     Socioeconomic History    Marital status: Single     Spouse name: Not on file    Number of children: Not on file    Years of education: Not on file    Highest education level: Not on file   Occupational History    Not on file   Tobacco Use    Smoking status: Former     Current packs/day: 0.00     Types: Cigarettes     Quit date: 2023     Years since quittin.0    Smokeless tobacco: Not on file   Substance and Sexual Activity    Alcohol use: Yes    Drug use: No    Sexual activity: Not on file   Other Topics Concern    Not on file   Social History Narrative    Not on file     Social Determinants of Health     Financial Resource Strain: High Risk (2024)    Received from [facility]    Overall Financial Resource Strain (CARDIA)     Difficulty of Paying Living Expenses: Hard   Food Insecurity: Food Insecurity Present (2024)    Received from [facility]    Hunger Vital Sign     Worried About Running Out of Food in the Last Year: Sometimes true     Ran Out of Food in the Last Year: Never true   Transportation Needs: No Transportation Needs (2024)    Received from [facility]    PRAPARE - Transportation     Lack of  Transportation (Medical): No     Lack of Transportation (Non-Medical): No   Physical Activity: Not on file   Stress: No Stress Concern Present (12/21/2023)    Received from [facility]    [employer] [place] [employer] - Occupational Stress Questionnaire     Feeling of Stress : Only a little   Social Connections: Feeling Socially Integrated (4/15/2024)    Received from [facility]    OASIS : Social Isolation     How often do you feel lonely or isolated from those around you?: Never   Intimate Partner Violence: Not At Risk (6/20/2024)    Received from [facility]    Humiliation, Afraid, Rape, and Kick questionnaire     Fear of Current or Ex-Partner: No     Emotionally Abused: No     Physically Abused: No     Sexually Abused: No   Housing Stability: High Risk (6/20/2024)    Received from [facility]    Housing Stability Vital Sign     Unable to Pay for Housing in the Last Year: Yes     Number of Times Moved in the Last Year: 1     Homeless in the Last Year: Yes     Past Surgical History:   Procedure Laterality Date    AORTA - BILATERAL FEMORAL ARTERY BYPASS GRAFT  09/15/2023    Dr. Ba    CATARACT EXTRACTION, BILATERAL      FEMORAL ARTERY STENT Right 08/07/2020    LEG AMPUTATION THROUGH KNEE Left 06/05/2024    Below-knee    ORCHIECTOMY Left 2000    Status post XRT    OTHER SURGICAL HISTORY      Surgery for cancer removal    RENAL ARTERY STENT Bilateral 09/11/2023    Bilateral renal artery angioplasty and stenting (Dr. Ba)       OBJECTIVE:  /66, Temp 98 F, HR 72, RR 18, O2 99%, Wt. 194 lbs  Physical Exam  Vitals and nursing note reviewed.   Constitutional:       General: He is awake. He is not in acute distress.     Appearance: He is well-groomed and normal weight. He is not ill-appearing or diaphoretic.   HENT:      Head: Normocephalic and atraumatic.   Cardiovascular:      Rate and Rhythm: Normal rate and regular rhythm.  "     Heart sounds: Normal heart sounds, S1 normal and S2 normal. No murmur heard.  Pulmonary:      Effort: Pulmonary effort is normal. No respiratory distress.      Breath sounds: Normal breath sounds.   Abdominal:      General: Bowel sounds are normal. There is no distension.      Palpations: Abdomen is soft.      Tenderness: There is no abdominal tenderness.   Musculoskeletal:      Right lower leg: No edema.      Left lower leg: No edema.      Comments: Left BKA      Left Lower Extremity: Left leg is amputated below knee.   Skin:     General: Skin is warm and dry.      Findings: Wound present.      Comments: Healing wounds around left knee, followed by wound care   Neurological:      Mental Status: He is alert and oriented to person, place, and time. Mental status is at baseline.      Gait: Gait abnormal.   Psychiatric:         Attention and Perception: Attention and perception normal.         Mood and Affect: Mood and affect normal.         Speech: Speech normal.         Behavior: Behavior normal. Behavior is cooperative.         Thought Content: Thought content normal.         Cognition and Memory: Cognition and memory normal.         Judgment: Judgment normal.       Labs & Imaging: All labs and imaging reviewed in EMR and PCC.  8/20/24:  Hbg 11.5  Hct 34.9  Wbc 6.8  Plt 202  Vit D 25 OH 51  TSH 2.37  Uric acid 5.3  PTH 14.2  HA1C 5.7    Lab Results   Component Value Date    WBC 12.90 (H) 08/11/2015    HGB 8.6 (L) 04/02/2024    HCT 17.1 (L) 03/28/2024    MCV 90 08/11/2015     08/11/2015     Lab Results   Component Value Date    SODIUM 135 06/06/2024    K 4.1 06/06/2024     06/06/2024    CO2 27 06/06/2024    BUN 13 06/06/2024    CREATININE 0.81 06/06/2024    GLUC 102 (H) 06/06/2024    CALCIUM 8.6 06/06/2024     No results found for: \"WKKPCZRM62\"  No results found for: \"EFM9RRJFZJGN\", \"TSH\"  No results found for: \"OPTF26PTDCWH\", \"GZRG85CCHDDA\"     FELICIA Lawton  Geriatric " "Medicine  9/16/24    Voice-recognition software may have been used in the preparation of this document. Occasional wrong word or \"sound-alike\" substitutions may have occurred due to the inherent limitations of voice recognition software. Interpretation should be guided by context.       "

## 2024-10-25 ENCOUNTER — NURSING HOME VISIT (OUTPATIENT)
Dept: GERIATRICS | Facility: OTHER | Age: 59
End: 2024-10-25
Payer: COMMERCIAL

## 2024-10-25 DIAGNOSIS — I73.9 PERIPHERAL ARTERIAL DISEASE (HCC): ICD-10-CM

## 2024-10-25 DIAGNOSIS — R22.41 LOCALIZED SWELLING OF RIGHT LOWER LEG: Primary | ICD-10-CM

## 2024-10-25 DIAGNOSIS — M1A.9XX0 CHRONIC GOUT WITHOUT TOPHUS, UNSPECIFIED CAUSE, UNSPECIFIED SITE: ICD-10-CM

## 2024-10-25 PROCEDURE — 99309 SBSQ NF CARE MODERATE MDM 30: CPT

## 2024-10-25 RX ORDER — ACETAMINOPHEN 325 MG/1
650 TABLET ORAL EVERY 4 HOURS PRN
COMMUNITY

## 2024-10-25 NOTE — ASSESSMENT & PLAN NOTE
Patient takes daily allopurinol  Most recent uric acid level stable  If right foot becomes red, tender, or more swollen, may need to recheck levels

## 2024-10-25 NOTE — PROGRESS NOTES
[facility]  [address]  [place] [address]  Facility: [facility]  [facility]  Long Term Care: POS 32    NAME: Todd Spaulding  AGE: 59 y.o. SEX: male    DATE OF ENCOUNTER: 10/25/24    Code status:  No CPR    Assessment and Plan     1. Localized swelling of right lower leg  Assessment & Plan:  Patient noted to have increased swelling to right lower extremity  +2 pitting edema from the calf down to the toes  No concern at this time for gout due to patient without pain or erythema  Continue taking daily allopurinol  Patient often sitting with his right leg hanging down on the ground, dependent edema  No history of CHF, lungs clear on auscultation  No concern for DVT, patient takes aspirin and Plavix  If worsening swelling or pain, will need to order a venous duplex  Weights continue to increase, good appetite  Encourage patient to elevate right lower extremity during the day, even when OOB  Patient states that he elevates his legs on a pillow at night  Will order a compression sleeve for right lower extremity to wear during the day  2. Chronic gout without tophus, unspecified cause, unspecified site  Assessment & Plan:  Patient takes daily allopurinol  Most recent uric acid level stable  If right foot becomes red, tender, or more swollen, may need to recheck levels  3. Peripheral arterial disease (HCC)  Assessment & Plan:  Continue rosuvastatin, Plavix, aspirin  Collaborate care with [facility] vascular surgery  Continue to monitor for acute changes in patient condition      All medications and routine orders were reviewed and updated as needed.    Plan discussed with: Patient    Chief Complaint     Acute visit    History of Present Illness     Patient was evaluated today for right lower extremity swelling.  On exam, patient is sitting at the edge of bed with his right leg dependent on the ground.  He says that he tends to sit like that, but at night he  keeps his leg elevated.  He has no complaints of pain and states that he did not realize how swollen it is.    The following portions of the patient's history were reviewed and updated as appropriate: current medications, past family history, past medical history, past social history, past surgical history and problem list.    Allergies:  Allergies   Allergen Reactions    Lipitor [Atorvastatin] Rash       Review of Systems     Review of Systems   Musculoskeletal:  Positive for back pain and gait problem.   All other systems reviewed and are negative.      Medications and orders     All medications reviewed and updated in senior care EMR.      Objective     Vitals: /78, temp 98.2 °F, HR 63, RR 20, O2 98%, weight 206 pounds    Physical Exam  Vitals and nursing note reviewed.   Constitutional:       General: He is awake. He is not in acute distress.     Appearance: He is well-groomed and normal weight. He is not ill-appearing or diaphoretic.   HENT:      Head: Normocephalic and atraumatic.   Cardiovascular:      Rate and Rhythm: Normal rate and regular rhythm.      Heart sounds: Normal heart sounds, S1 normal and S2 normal. No murmur heard.  Pulmonary:      Effort: Pulmonary effort is normal. No respiratory distress.      Breath sounds: Normal breath sounds.   Abdominal:      General: Bowel sounds are normal. There is no distension.      Palpations: Abdomen is soft.      Tenderness: There is no abdominal tenderness.   Musculoskeletal:         General: Swelling present.      Right lower le+ Pitting Edema present.      Left lower leg: No edema.      Left Lower Extremity: Left leg is amputated below knee.   Skin:     General: Skin is warm and dry.      Findings: Wound present.      Comments: Healing wounds around left knee, followed by wound care   Neurological:      Mental Status: He is alert and oriented to person, place, and time. Mental status is at baseline.      Gait: Gait abnormal.   Psychiatric:          "Attention and Perception: Attention and perception normal.         Mood and Affect: Mood and affect normal.         Speech: Speech normal.         Behavior: Behavior normal. Behavior is cooperative.         Thought Content: Thought content normal.         Cognition and Memory: Cognition and memory normal.         Judgment: Judgment normal.       Pertinent Laboratory/Diagnostic Studies:     All labs and studies were reviewed please see chart or hospital paperwork for details.    Portions of the record may have been created with voice recognition software.  Occasional wrong word or \"sound a like\" substitutions may have occurred due to the inherent limitations of voice recognition software.  Read the chart carefully and recognize, using context, where substitutions have occurred.    FELICIA Lawton  Geriatric Medicine  10/25/2024 2:13 PM      "

## 2024-10-25 NOTE — ASSESSMENT & PLAN NOTE
Patient noted to have increased swelling to right lower extremity  +2 pitting edema from the calf down to the toes  No concern at this time for gout due to patient without pain or erythema  Continue taking daily allopurinol  Patient often sitting with his right leg hanging down on the ground, dependent edema  No history of CHF, lungs clear on auscultation  No concern for DVT, patient takes aspirin and Plavix  If worsening swelling or pain, will need to order a venous duplex  Weights continue to increase, good appetite  Encourage patient to elevate right lower extremity during the day, even when OOB  Patient states that he elevates his legs on a pillow at night  Will order a compression sleeve for right lower extremity to wear during the day

## 2024-10-29 ENCOUNTER — NURSING HOME VISIT (OUTPATIENT)
Dept: GERIATRICS | Facility: OTHER | Age: 59
End: 2024-10-29

## 2024-10-29 DIAGNOSIS — R22.41 LOCALIZED SWELLING OF RIGHT LOWER LEG: Primary | ICD-10-CM

## 2024-10-29 NOTE — ASSESSMENT & PLAN NOTE
Increased swelling to right lower extremity  Patient has been wearing compression stockings and elevating feet as much as possible, including at night  +2 pitting edema from the mid calf down to his toes  Swelling appears to be unilateral  No erythema or pain  Will order a venous duplex of the right lower extremity to rule out DVT  Patient currently taking aspirin and Plavix  If no DVT, may need an echocardiogram to rule out CHF and or diurese

## 2024-10-29 NOTE — PROGRESS NOTES
[facility]  [address]  [place] [address]  Facility: [facility]  [facility]  Long Term Care: POS 32    NAME: Todd Spaulding  AGE: 59 y.o. SEX: male    DATE OF ENCOUNTER: 10/29/24    Code status:  No CPR    Assessment and Plan     1. Localized swelling of right lower leg  Assessment & Plan:  Increased swelling to right lower extremity  Patient has been wearing compression stockings and elevating feet as much as possible, including at night  +2 pitting edema from the mid calf down to his toes  Swelling appears to be unilateral  No erythema or pain  Will order a venous duplex of the right lower extremity to rule out DVT  Patient currently taking aspirin and Plavix  If no DVT, may need an echocardiogram to rule out CHF and or diurese      All medications and routine orders were reviewed and updated as needed.    Plan discussed with: Patient    Chief Complaint     Acute visit    History of Present Illness     Patient continues to have increased right lower extremity swelling.    The following portions of the patient's history were reviewed and updated as appropriate: current medications, past family history, past medical history, past social history, past surgical history and problem list.    Allergies:  Allergies   Allergen Reactions    Lipitor [Atorvastatin] Rash       Review of Systems     Review of Systems   Cardiovascular:  Positive for leg swelling.   Musculoskeletal:  Positive for gait problem.       Medications and orders     All medications reviewed and updated in assisted EMR.      Objective       Physical Exam  Vitals and nursing note reviewed.   Constitutional:       General: He is awake.      Appearance: Normal appearance. He is well-groomed.   Pulmonary:      Effort: Pulmonary effort is normal.   Musculoskeletal:         General: Swelling present.      Right lower leg: Swelling present. 2+ Pitting Edema present.      Left lower leg:  "No edema.      Right ankle: Swelling present.      Right foot: Swelling present.      Comments: Swelling starts around mid calf      Left Lower Extremity: Left leg is amputated below knee.   Skin:     General: Skin is warm and dry.   Neurological:      Mental Status: He is alert and oriented to person, place, and time. Mental status is at baseline.      Gait: Gait abnormal.   Psychiatric:         Attention and Perception: Attention and perception normal.         Mood and Affect: Mood normal.         Speech: Speech normal.         Behavior: Behavior normal. Behavior is cooperative.         Thought Content: Thought content normal.         Cognition and Memory: Cognition normal.       Pertinent Laboratory/Diagnostic Studies:     The following labs and studies were reviewed please see chart or hospital paperwork for details.      Portions of the record may have been created with voice recognition software.  Occasional wrong word or \"sound a like\" substitutions may have occurred due to the inherent limitations of voice recognition software.  Read the chart carefully and recognize, using context, where substitutions have occurred.    FELICIA Lawton  Geriatric Medicine  10/29/2024 2:59 PM      "

## 2024-10-31 ENCOUNTER — NURSING HOME VISIT (OUTPATIENT)
Dept: GERIATRICS | Facility: OTHER | Age: 59
End: 2024-10-31

## 2024-10-31 DIAGNOSIS — G54.6 PHANTOM PAIN FOLLOWING AMPUTATION OF LOWER LIMB (HCC): ICD-10-CM

## 2024-10-31 DIAGNOSIS — I73.9 PERIPHERAL ARTERIAL DISEASE (HCC): ICD-10-CM

## 2024-10-31 DIAGNOSIS — R22.41 LOCALIZED SWELLING OF RIGHT LOWER LEG: Primary | ICD-10-CM

## 2024-10-31 NOTE — PROGRESS NOTES
[facility]  [address]  [place] [address]  Facility: [facility]  [facility]  Long Term Care: POS 32    NAME: Todd Spaulding  AGE: 59 y.o. SEX: male    DATE OF ENCOUNTER: 10/31/24    Code status:  No CPR    Assessment and Plan     1. Localized swelling of right lower leg  Assessment & Plan:  Patient continues to have +2 pitting edema on the right lower leg and foot  Stat venous duplex results were negative for DVTs  Patient continues to wear compression stockings and usually has his leg elevated  No erythema or pain on exam  Continue taking aspirin and Plavix  Discussion with Dr. Iqbal and decision made to move forward with echocardiogram and diuretics  Order for echocardiogram to rule out CHF  20 mg of Lasix ordered for 3 days along with 10 mEq KCl for 3 days to decrease edema  Will assess a BMP on Tuesday 11/5 to check electrolytes  Plan to slowly wean down gabapentin which may also be causing peripheral edema  2. 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 Peripheral arterial disease (HCC)  Assessment & Plan:  Continue rosuvastatin, Plavix, aspirin  Collaborate care with [facility] vascular surgery  Continue to monitor for acute changes in patient condition      All medications and routine orders were reviewed and updated as needed.    Plan discussed with: Patient    Chief Complaint     Acute visit    History of Present Illness     Patient with  localized swelling to right lower extremity.  Elevation and compression has not helped return leg and foot back to baseline size.  Further workup necessary.    The following portions of the patient's history were reviewed and updated as appropriate: current medications, past family history, past medical history, past social history, past surgical history and problem list.    Allergies:  Allergies   Allergen Reactions    Lipitor [Atorvastatin] Rash       Review of Systems     Review of Systems   Cardiovascular:  Positive for leg swelling.   Musculoskeletal:  Positive for gait problem.       Medications and orders     All medications reviewed and updated in half-way EMR.      Objective     Vitals: /70, temp 98.2 °F, HR 63, RR 20, O2 98%, weight 206 pounds    Physical Exam  Vitals and nursing note reviewed.   Constitutional:       General: He is awake.      Appearance: Normal appearance. He is well-groomed.   Pulmonary:      Effort: Pulmonary effort is normal.   Musculoskeletal:         General: Swelling present.      Right lower leg: Swelling present. 2+ Pitting Edema present.      Left lower leg: No edema.      Right ankle: Swelling present.      Right foot: Swelling present.      Comments: Swelling starts around mid calf      Left Lower Extremity: Left leg is amputated below knee.   Skin:     General: Skin is warm and dry.   Neurological:      Mental Status: He is alert and oriented to person, place, and time. Mental status is at baseline.      Gait: Gait abnormal.   Psychiatric:         Attention and Perception: Attention and perception normal.         Mood and Affect: Mood normal.         Speech: Speech normal.         Behavior: Behavior normal. Behavior is cooperative.         Thought Content: Thought content normal.         Cognition and Memory: Cognition normal.       Pertinent Laboratory/Diagnostic Studies:     The following studies were reviewed please see chart or hospital paperwork for  "details.    10/31/24: Venous duplex scan of right lower extremity negative for DVTs    Portions of the record may have been created with voice recognition software.  Occasional wrong word or \"sound a like\" substitutions may have occurred due to the inherent limitations of voice recognition software.  Read the chart carefully and recognize, using context, where substitutions have occurred.    FELICIA Lawton  Geriatric Medicine  10/31/2024       "

## 2024-10-31 NOTE — ASSESSMENT & PLAN NOTE
Patient continues to have +2 pitting edema on the right lower leg and foot  Stat venous duplex results were negative for DVTs  Patient continues to wear compression stockings and usually has his leg elevated  No erythema or pain on exam  Continue taking aspirin and Plavix  Discussion with Dr. Iqbal and decision made to move forward with echocardiogram and diuretics  Order for echocardiogram to rule out CHF  20 mg of Lasix ordered for 3 days along with 10 mEq KCl for 3 days to decrease edema  Will assess a BMP on Tuesday 11/5 to check electrolytes  Plan to slowly wean down gabapentin which may also be causing peripheral edema

## 2024-10-31 NOTE — ASSESSMENT & PLAN NOTE
Patient notes minimal leg pain if any  States that he is often tired throughout the day and goes to bed early  As needed Tylenol for pain  Currently taking 400 mg gabapentin 3 times daily, plan to wean this down to 100 mg 3 times daily within the next few weeks  This could be causing increased fatigue as well as lower extremity edema  Patient in agreement with plan and states that the gabapentin was only increased for pain management when he was in the hospital after BKA

## 2024-11-06 ENCOUNTER — NURSING HOME VISIT (OUTPATIENT)
Dept: GERIATRICS | Facility: OTHER | Age: 59
End: 2024-11-06
Payer: COMMERCIAL

## 2024-11-06 DIAGNOSIS — R73.03 PREDIABETES: ICD-10-CM

## 2024-11-06 DIAGNOSIS — G54.6 PHANTOM PAIN FOLLOWING AMPUTATION OF LOWER LIMB (HCC): ICD-10-CM

## 2024-11-06 DIAGNOSIS — I51.7 CONCENTRIC LEFT VENTRICULAR HYPERTROPHY: ICD-10-CM

## 2024-11-06 DIAGNOSIS — I10 ESSENTIAL HYPERTENSION: ICD-10-CM

## 2024-11-06 DIAGNOSIS — R22.41 LOCALIZED SWELLING OF RIGHT LOWER LEG: Primary | ICD-10-CM

## 2024-11-06 PROCEDURE — 99309 SBSQ NF CARE MODERATE MDM 30: CPT

## 2024-11-06 RX ORDER — GABAPENTIN 100 MG/1
100 CAPSULE ORAL 3 TIMES DAILY
COMMUNITY

## 2024-11-06 RX ORDER — POTASSIUM CHLORIDE 750 MG/1
10 TABLET, EXTENDED RELEASE ORAL DAILY
COMMUNITY

## 2024-11-06 RX ORDER — FUROSEMIDE 20 MG/1
20 TABLET ORAL DAILY
COMMUNITY

## 2024-11-06 NOTE — ASSESSMENT & PLAN NOTE
8/20/24: HA1C 5.7%,   Hemoglobin A1c has improved since July  Patient is right on the edge of prediabetes  Last fasting blood sugar on labs 11/5, glucose 260  Since admission, patient has gained a lot of weight and enjoys foods with carbs and sugars  Encouraged patient to be mindful of food choices and eat unhealthy foods in moderation  Will continue to monitor intermittent labs

## 2024-11-06 NOTE — ASSESSMENT & PLAN NOTE
At one time during admission patient had been taking lisinopril  Recent blood pressure review shows episodes of hypertension  Systolic blood pressure anywhere from 110-179  Ordered to monitor blood pressures daily  Continue to trend blood pressures and may need to restart lisinopril  Healthy diet recommended

## 2024-11-06 NOTE — ASSESSMENT & PLAN NOTE
Patient with +2 edema to right lower extremity and foot  Venous duplex negative for DVTs  Patient continues to wear compression stocking and elevate throughout the day  No pain or erythema  Admits that his balance was not great prior to admission, so he is having trouble walking with crutches and swollen foot  Echo completed to rule out CHF, see concentric left ventricular hypertrophy  Short course of Lasix and potassium chloride appeared to help a bit with swelling  Will continue daily dose of 20 mg of Lasix and 10 mEqs KCl for lower extremity edema  Intermittent laboratory monitoring to assess electrolytes and kidney function

## 2024-11-06 NOTE — ASSESSMENT & PLAN NOTE
See echocardiogram results on 11/4/2024 below  Will continue to monitor vital signs  May need to start agent for hypertension if blood pressures continue to be elevated  Current to healthy diet and exercise

## 2024-11-06 NOTE — ASSESSMENT & PLAN NOTE
Patient recently taking 400 mg of gabapentin 3 times daily  Recently weaned patient down to 100 mg 3 times daily  Patient did notice recently some increased phantom pain  Appointment on Friday with physiatry for recommendations  Can always increase gabapentin if needed  Patient reported that gabapentin may have been causing daytime sleepiness and fatigue  Reported today that his sleep has remained about the same and he still feels a bit tired, he does have a roommate that requires more help throughout the night

## 2024-11-06 NOTE — PROGRESS NOTES
[facility]  [address]  [place] [address]  Facility: [facility]  [facility]  Long Term Care: POS 32    NAME: Todd Spaulding  AGE: 59 y.o. SEX: male    DATE OF ENCOUNTER: 11/6/24    Code status:  No CPR    Assessment and Plan     1. Localized swelling of right lower leg  Assessment & Plan:  Patient with +2 edema to right lower extremity and foot  Venous duplex negative for DVTs  Patient continues to wear compression stocking and elevate throughout the day  No pain or erythema  Admits that his balance was not great prior to admission, so he is having trouble walking with crutches and swollen foot  Echo completed to rule out CHF, see concentric left ventricular hypertrophy  Short course of Lasix and potassium chloride appeared to help a bit with swelling  Will continue daily dose of 20 mg of Lasix and 10 mEqs KCl for lower extremity edema  Intermittent laboratory monitoring to assess electrolytes and kidney function  2. Prediabetes  Assessment & Plan:  8/20/24: HA1C 5.7%,   Hemoglobin A1c has improved since July  Patient is right on the edge of prediabetes  Last fasting blood sugar on labs 11/5, glucose 260  Since admission, patient has gained a lot of weight and enjoys foods with carbs and sugars  Encouraged patient to be mindful of food choices and eat unhealthy foods in moderation  Will continue to monitor intermittent labs  3. 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 Essential hypertension  Assessment & Plan:  At one time during admission patient had been taking lisinopril  Recent blood pressure review shows episodes of hypertension  Systolic blood pressure anywhere from 110-179  Ordered to monitor blood pressures daily  Continue to trend blood pressures and may need to restart lisinopril  Healthy diet recommended  5. Concentric left ventricular hypertrophy  Assessment & Plan:  See echocardiogram results on 11/4/2024 below  Will continue to monitor vital signs  May need to start agent for hypertension if blood pressures continue to be elevated  Current to healthy diet and exercise      All medications and routine orders were reviewed and updated as needed.    Plan discussed with: Patient    Chief Complaint     Acute visit    History of Present Illness     Patient seen to follow-up after echocardiogram and short trial of Lasix.  Patient is doing well although continues to have some right lower extremity edema.    The following portions of the patient's history were reviewed and updated as appropriate: current medications, past family history, past medical history, past social history, past surgical history and problem list.    Allergies:  Allergies   Allergen Reactions    Lipitor [Atorvastatin] Rash       Review of Systems     Review of Systems   Cardiovascular:  Positive for leg swelling.   Musculoskeletal:  Positive for arthralgias, back pain and gait problem.   Skin:  Positive for wound.   Neurological:  Positive for weakness.       Medications and orders     All medications reviewed and updated in [facility] EMR.      Objective     Vitals: /83, temp 97.4 °F, HR 62, RR 20, O2 97%, weight 216.1 pounds    Physical Exam  Vitals and nursing note reviewed.   Constitutional:       General: He is awake.      Appearance: Normal appearance. He is well-groomed and overweight.   Cardiovascular:       "Rate and Rhythm: Normal rate and regular rhythm.      Heart sounds: Normal heart sounds, S1 normal and S2 normal. No murmur heard.  Pulmonary:      Effort: Pulmonary effort is normal. No respiratory distress.      Breath sounds: Normal breath sounds. No wheezing.   Abdominal:      General: Bowel sounds are normal. There is no distension.      Palpations: Abdomen is soft.      Tenderness: There is no abdominal tenderness.   Musculoskeletal:         General: Swelling present.      Right lower leg: Swelling present. 2+ Pitting Edema present.      Left lower leg: No edema.      Right ankle: Swelling present.      Right foot: Swelling present.      Comments: Swelling starts around mid calf      Left Lower Extremity: Left leg is amputated below knee.   Skin:     General: Skin is warm and dry.      Findings: Wound present.      Comments: Chronic healing wounds around left knee   Neurological:      Mental Status: He is alert and oriented to person, place, and time. Mental status is at baseline.      Gait: Gait abnormal.   Psychiatric:         Attention and Perception: Attention and perception normal.         Mood and Affect: Mood normal.         Speech: Speech normal.         Behavior: Behavior normal. Behavior is cooperative.         Thought Content: Thought content normal.         Cognition and Memory: Cognition normal.       Pertinent Laboratory/Diagnostic Studies:     The following labs and studies were reviewed please see chart or hospital paperwork for details.    11/5/24:  Bun 20  Crt 1.02  Na 138  K 4.2  Gfr 86  Glucose 260    11/4/24: Echo  Concentric left ventricular hypertrophy with preserved systolic function without segmental abnormality. EF estimated 50-55%. No definitive segmental wall motion abnormalities noted. Grade 1 diastolic dysfunction. Left atrial enlargement.    Portions of the record may have been created with voice recognition software.  Occasional wrong word or \"sound a like\" substitutions may " have occurred due to the inherent limitations of voice recognition software.  Read the chart carefully and recognize, using context, where substitutions have occurred.    FELICIA Lawton  Geriatric Medicine  11/6/2024 3:09 PM

## 2024-12-27 ENCOUNTER — NURSING HOME VISIT (OUTPATIENT)
Dept: GERIATRICS | Facility: OTHER | Age: 59
End: 2024-12-27
Payer: COMMERCIAL

## 2024-12-27 DIAGNOSIS — R09.81 NASAL CONGESTION: Primary | ICD-10-CM

## 2024-12-27 PROCEDURE — 99308 SBSQ NF CARE LOW MDM 20: CPT

## 2024-12-27 NOTE — ASSESSMENT & PLAN NOTE
Patient reported that he is having a lot of sinus pain and pressure, as needed Tylenol effective for pain management  Reports rhinorrhea, nasal congestion, and watery eyes  No chest pain, cough, or sore throat  Order for Flonase spray to both nostrils, 2 sprays daily for 10 days and Mucinex  mg every 12 hours for 10 days  Continue to monitor patient for any changes in condition  May need antibiotics if no improvement  Vital signs stable, continue to monitor

## 2024-12-27 NOTE — PROGRESS NOTES
[facility]  [address]  [place] [address]  Facility: [facility]  [facility]  Long Term Care: POS 32    NAME: Todd Spaulding  AGE: 59 y.o. SEX: male    DATE OF ENCOUNTER: 12/27/24    Code status:  No CPR    Assessment and Plan     1. Nasal congestion  Assessment & Plan:  Patient reported that he is having a lot of sinus pain and pressure, as needed Tylenol effective for pain management  Reports rhinorrhea, nasal congestion, and watery eyes  No chest pain, cough, or sore throat  Order for Flonase spray to both nostrils, 2 sprays daily for 10 days and Mucinex  mg every 12 hours for 10 days  Continue to monitor patient for any changes in condition  May need antibiotics if no improvement  Vital signs stable, continue to monitor      All medications and routine orders were reviewed and updated as needed.    Plan discussed with: Patient    Chief Complaint     Acute visit    History of Present Illness     Patient complaining of congestion, rhinorrhea, watery eyes, and sinus pressure.  He states that he feels like he is getting a cold.    The following portions of the patient's history were reviewed and updated as appropriate: current medications, past family history, past medical history, past social history, past surgical history and problem list.    Allergies:  Allergies   Allergen Reactions   • Lipitor [Atorvastatin] Rash       Review of Systems     Review of Systems   HENT:  Positive for congestion, rhinorrhea, sinus pressure and sinus pain. Negative for sore throat.    Eyes:  Negative for pain, redness and itching.        Watery eyes   Respiratory:  Negative for cough and chest tightness.        Medications and orders     All medications reviewed and updated in [facility] EMR.      Objective     Vitals: /66, temp 98.1 °F, HR 67, RR 18, O2 96%, weight 220.6 pounds    Physical Exam  Vitals and nursing note reviewed.   Constitutional:      "  General: He is awake.      Appearance: Normal appearance. He is well-groomed and overweight.   HENT:      Head: Normocephalic and atraumatic.      Nose: Congestion and rhinorrhea present. Rhinorrhea is clear.      Right Sinus: Maxillary sinus tenderness and frontal sinus tenderness present.      Left Sinus: Maxillary sinus tenderness and frontal sinus tenderness present.   Eyes:      General: Lids are normal.      Comments: Watery eyes   Pulmonary:      Effort: Pulmonary effort is normal.   Musculoskeletal:      Left Lower Extremity: Left leg is amputated below knee.   Skin:     General: Skin is warm and dry.   Neurological:      Mental Status: He is alert and oriented to person, place, and time. Mental status is at baseline.      Gait: Gait abnormal.   Psychiatric:         Attention and Perception: Attention and perception normal.         Mood and Affect: Mood and affect normal.         Speech: Speech normal.         Behavior: Behavior normal. Behavior is cooperative.         Thought Content: Thought content normal.         Cognition and Memory: Cognition and memory normal.         Judgment: Judgment normal.       Portions of the record may have been created with voice recognition software.  Occasional wrong word or \"sound a like\" substitutions may have occurred due to the inherent limitations of voice recognition software.  Read the chart carefully and recognize, using context, where substitutions have occurred.    FELICIA Lawton  Geriatric Medicine  12/27/24  "

## 2025-01-21 ENCOUNTER — NURSING HOME VISIT (OUTPATIENT)
Dept: GERIATRICS | Facility: OTHER | Age: 60
End: 2025-01-21
Payer: COMMERCIAL

## 2025-01-21 VITALS
WEIGHT: 223 LBS | DIASTOLIC BLOOD PRESSURE: 53 MMHG | OXYGEN SATURATION: 94 % | SYSTOLIC BLOOD PRESSURE: 111 MMHG | TEMPERATURE: 97.9 F | HEART RATE: 76 BPM

## 2025-01-21 DIAGNOSIS — R22.41 LOCALIZED SWELLING OF RIGHT LOWER LEG: ICD-10-CM

## 2025-01-21 DIAGNOSIS — R63.5 WEIGHT GAIN: Primary | ICD-10-CM

## 2025-01-21 DIAGNOSIS — I51.7 CONCENTRIC LEFT VENTRICULAR HYPERTROPHY: ICD-10-CM

## 2025-01-21 PROCEDURE — 99309 SBSQ NF CARE MODERATE MDM 30: CPT

## 2025-01-21 NOTE — ASSESSMENT & PLAN NOTE
Steady weight gain at facility, 40 lbs in 6 months   Reports never having three meals a day prior to admission to facility, hx of homelessness   Some dependent edema, reports this is no worse than usual   Weight gain does not appear to be fluid weight but caloric weight

## 2025-01-21 NOTE — ASSESSMENT & PLAN NOTE
With +1 pitting edema to right ankle  Reports no worse than usual   Encourage ABDULKADIR stocking   Reports he has started walking with new LLE prosthetic which may help with edema

## 2025-01-21 NOTE — PROGRESS NOTES
[facility]  [facility]  [address], [place], [place]  [phone]  [facility] POS [address]    NAME: Todd Spaulding  AGE: 59 y.o. SEX: male  : 1965     DATE: 2025    CODE STATUS: No CPR     Assessment and Plan:     Problem List Items Addressed This Visit       Localized swelling of right lower leg    With +1 pitting edema to right ankle  Reports no worse than usual   Encourage ABDULKADIR stocking   Reports he has started walking with new LLE prosthetic which may help with edema          Concentric left ventricular hypertrophy    Echo from 2024 with concentric left ventricular hypertrophy   No overt s/s overload   Continue lasix          Weight gain - Primary    Steady weight gain at facility, 40 lbs in 6 months   Reports never having three meals a day prior to admission to facility, hx of homelessness   Some dependent edema, reports this is no worse than usual   Weight gain does not appear to be fluid weight but caloric weight                  History of Present Illness:     Patient being seen for weight gain. Patient with weight gain of 40 lbs in the last 6 months since being at facility. He was previously homeless and reports he never had 3 meals a day before. He denies CP/SOB. He has some RLE edema but this is baseline for him.         The following portions of the patient's history were reviewed and updated as appropriate: allergies, current medications, past family history, past medical history, past social history, past surgical history and problem list.     Review of Systems:     Review of Systems   All other systems reviewed and are negative.       Problem List:     Patient Active Problem List   Diagnosis    Peripheral arterial disease (HCC)    Renovascular hypertension    Hyperlipidemia    Renal artery stenosis, native, bilateral (HCC)    History of CVA with residual deficit    History of tobacco use disorder    DNR (do not resuscitate)    History of testicular cancer    History of  left below knee amputation (HCC)    Phantom pain following amputation of lower limb (HCC)    History of substance abuse (HCC)    Sheltered homelessness    Gout    Frailty    Vitamin D deficiency    Prediabetes    Postoperative anemia    Falls    Localized swelling of right lower leg    Essential hypertension    Concentric left ventricular hypertrophy    Nasal congestion    Weight gain        Objective:     /53   Pulse 76   Temp 97.9 °F (36.6 °C)   SpO2 94%     Physical Exam  Vitals and nursing note reviewed.   Constitutional:       General: He is not in acute distress.     Appearance: He is well-developed.   HENT:      Head: Normocephalic and atraumatic.   Eyes:      Conjunctiva/sclera: Conjunctivae normal.   Cardiovascular:      Rate and Rhythm: Normal rate and regular rhythm.      Heart sounds: No murmur heard.  Pulmonary:      Effort: Pulmonary effort is normal. No respiratory distress.      Breath sounds: Normal breath sounds.   Musculoskeletal:         General: No swelling.      Cervical back: Neck supple.      Right lower leg: Edema present.   Skin:     General: Skin is warm and dry.      Capillary Refill: Capillary refill takes less than 2 seconds.   Neurological:      Mental Status: He is alert.   Psychiatric:         Mood and Affect: Mood normal.         Pertinent Laboratory/Diagnostic Studies:    Laboratory Results: I have personally reviewed the pertinent laboratory results/reports     Radiology/Other Diagnostic Testing Results: I have personally reviewed the pertinent diagnostic reports/results.       FELICIA Stevens  Geriatric Medicine

## 2025-01-21 NOTE — ASSESSMENT & PLAN NOTE
Echo from 11/2024 with concentric left ventricular hypertrophy   No overt s/s overload   Continue lasix

## 2025-01-30 ENCOUNTER — NURSING HOME VISIT (OUTPATIENT)
Dept: GERIATRICS | Facility: OTHER | Age: 60
End: 2025-01-30
Payer: COMMERCIAL

## 2025-01-30 DIAGNOSIS — I69.30 HISTORY OF CVA WITH RESIDUAL DEFICIT: ICD-10-CM

## 2025-01-30 DIAGNOSIS — I73.9 PERIPHERAL ARTERIAL DISEASE (HCC): ICD-10-CM

## 2025-01-30 DIAGNOSIS — Z89.512 HISTORY OF LEFT BELOW KNEE AMPUTATION (HCC): Primary | ICD-10-CM

## 2025-01-30 DIAGNOSIS — I70.1 RENAL ARTERY STENOSIS, NATIVE, BILATERAL (HCC): ICD-10-CM

## 2025-01-30 DIAGNOSIS — M1A.9XX0 CHRONIC GOUT WITHOUT TOPHUS, UNSPECIFIED CAUSE, UNSPECIFIED SITE: ICD-10-CM

## 2025-01-30 DIAGNOSIS — E78.5 HYPERLIPIDEMIA, UNSPECIFIED HYPERLIPIDEMIA TYPE: ICD-10-CM

## 2025-01-30 DIAGNOSIS — Z59.01 SHELTERED HOMELESSNESS: ICD-10-CM

## 2025-01-30 DIAGNOSIS — R22.41 LOCALIZED SWELLING OF RIGHT LOWER LEG: ICD-10-CM

## 2025-01-30 DIAGNOSIS — R73.03 PREDIABETES: ICD-10-CM

## 2025-01-30 DIAGNOSIS — E55.9 VITAMIN D DEFICIENCY: ICD-10-CM

## 2025-01-30 DIAGNOSIS — I51.7 CONCENTRIC LEFT VENTRICULAR HYPERTROPHY: ICD-10-CM

## 2025-01-30 DIAGNOSIS — I10 ESSENTIAL HYPERTENSION: ICD-10-CM

## 2025-01-30 PROBLEM — R09.81 NASAL CONGESTION: Status: RESOLVED | Noted: 2024-12-27 | Resolved: 2025-01-30

## 2025-01-30 PROCEDURE — 99316 NF DSCHRG MGMT 30 MIN+: CPT

## 2025-01-30 SDOH — ECONOMIC STABILITY - HOUSING INSECURITY: SHELTERED HOMELESSNESS: Z59.01

## 2025-01-30 NOTE — PROGRESS NOTES
[facility]  [address] [address]  [phone]  DISCHARGE SUMMARY  Facility: [facility]  POS: 32: NF- Long Term    NAME: Todd Spaulding  AGE: 59 y.o. SEX: male  DATE OF ADMISSION: 6/20/25 DATE OF DISCHARGE: 1/31/25 DISCHARGE DISPOSITION: [facility]    Reason for admission: Patient was admitted from [facility] for rehabilitation after hospitalization for limb ischemia of LLE due to occluded left femoral to PT bypass requiring left BKA.    Admission Diagnoses:  1. History of left below knee amputation (HCC)  Assessment & Plan:  Left BKA 6/5/25  Secondary to peripheral arterial disease and occluded bypass  Follow with vascular surgery service  Recently fit for prosthetic, continue working with PT/OT  2. Concentric left ventricular hypertrophy  Assessment & Plan:  Echo completed for lower extremity edema  Echocardiogram results on 11/4/2024 show concentric left ventricular hypertrophy  Continue lasix  3. Essential hypertension  Assessment & Plan:  Continue blood pressure monitoring  Recommend a low sodium and healthy diet  No medication needed at this time  4. Peripheral arterial disease (HCC)  Assessment & Plan:  Continue rosuvastatin, Plavix, aspirin  Follow up with [facility] vascular surgery after discharge  5. Renal artery stenosis, native, bilateral (HCC)  Assessment & Plan:  9/11/23: Bilateral renal artery angioplasty with stenting  Care in collaboration with his [facility] vascular surgery service  Continue  aspirin, clopidogrel, and rosuvastatin  6. Sheltered homelessness  Assessment & Plan:  Case management providing safe discharge planning to [facility]  7. Chronic gout without tophus, unspecified cause, unspecified site  Assessment & Plan:  8/20/24: Uric acid 5.3  Continue daily allopurinol  8. History of CVA with residual deficit  Assessment & Plan:  Continue aspirin, Plavix, and rosuvastatin  9. Hyperlipidemia, unspecified hyperlipidemia type  Assessment &  Plan:  Continue rosuvastatin  Follow up with PCP  10. Localized swelling of right lower leg  Assessment & Plan:  Patient with +2 edema to right lower extremity  Encourage compression socks and leg elevation  Continue daily lasix and potassium chloride  Intermittent laboratory monitoring to assess electrolytes and kidney function  11. Vitamin D deficiency  Assessment & Plan:  Continue vitamin D supplement  Intermittent laboratory monitoring   12. Prediabetes  Assessment & Plan:  Since admission to rehab, patient has gained a lot of weight   Encourage patient to be mindful of food choices and eat a healthy diet        Course of stay: Patient was admitted to [facility] for rehabilitation due to prolonged hospital stay. Significant events during the stay include significant weight gain and right lower extremity edema. He is now on daily diuretic therapy with improvement. The patient also needed close monitoring and assistance with wound management. The patient participated in PT/OT. He was fit for a prosthetic and has been improving with therapy.    Labs and testing performed during stay:  11/5/25:   Bun 20  Crt 1.02  Na 138  K 4.2  Gfr 86  Glucose 260    Discharge Medications: See discharge medication list which was reviewed and signed.    Status at time of discharge: Stable    Today's Visit: 1/30/20252:15 PM    Vitals:/53, Temp 97.9 F, HR 76, RR 19, O2 94%, Wt. 223.2 lbs    Exam: Physical Exam  Vitals and nursing note reviewed.   Constitutional:       General: He is awake.      Appearance: Normal appearance. He is well-groomed and overweight.   HENT:      Head: Normocephalic and atraumatic.   Cardiovascular:      Rate and Rhythm: Normal rate and regular rhythm.      Heart sounds: Normal heart sounds, S1 normal and S2 normal. No murmur heard.  Pulmonary:      Effort: Pulmonary effort is normal. No respiratory distress.      Breath sounds: Normal breath sounds. No wheezing.   Abdominal:      General: Bowel sounds  are normal. There is no distension.      Palpations: Abdomen is soft.      Tenderness: There is no abdominal tenderness.   Musculoskeletal:         General: Swelling present.      Right lower leg: Swelling present. 2+ Edema present.      Left lower leg: No edema.      Right ankle: Swelling present.      Right foot: Swelling present.      Left Lower Extremity: Left leg is amputated below knee.   Skin:     General: Skin is warm and dry.      Findings: Wound present.      Comments: Chronic healing wound on left knee   Neurological:      Mental Status: He is alert and oriented to person, place, and time. Mental status is at baseline.      Gait: Gait abnormal.   Psychiatric:         Attention and Perception: Attention and perception normal.         Mood and Affect: Mood normal.         Speech: Speech normal.         Behavior: Behavior normal. Behavior is cooperative.         Thought Content: Thought content normal.         Cognition and Memory: Cognition normal.       Discussion with patient/family and further instructions:  -Fall precautions  -Aspiration precautions  -Bleeding precautions  -Monitor for signs/symptoms of infection  -Medication list was reviewed and signed  -DME form was completed    Follow-up Recommendations: Please follow-up with your primary care physician within 7-10 days of discharge to review medication changes and current status.     Problem List Follow-up Recommendations:  - [facility] Vascular Surgery appointment 5/5/25    I have spent 48 minutes with Patient  today in which greater than 50% of this time was spent in counseling/coordination of care regarding Diagnostic results, Prognosis, Risks and benefits of tx options, Instructions for management, Patient and family education, Importance of tx compliance, Risk factor reductions, Impressions, Counseling / Coordination of care, Documenting in the medical record, Reviewing / ordering tests, medicine, procedures  , and Obtaining or reviewing history   .    FELICIA Judge  1/30/20252:15 PM

## 2025-01-30 NOTE — ASSESSMENT & PLAN NOTE
Left BKA 6/5/25  Secondary to peripheral arterial disease and occluded bypass  Follow with vascular surgery service  Recently fit for prosthetic, continue working with PT/OT

## 2025-01-30 NOTE — ASSESSMENT & PLAN NOTE
Continue blood pressure monitoring  Recommend a low sodium and healthy diet  No medication needed at this time

## 2025-01-30 NOTE — ASSESSMENT & PLAN NOTE
Since admission to rehab, patient has gained a lot of weight   Encourage patient to be mindful of food choices and eat a healthy diet

## 2025-01-30 NOTE — ASSESSMENT & PLAN NOTE
Echo completed for lower extremity edema  Echocardiogram results on 11/4/2024 show concentric left ventricular hypertrophy  Continue lasix

## 2025-01-30 NOTE — ASSESSMENT & PLAN NOTE
Patient with +2 edema to right lower extremity  Encourage compression socks and leg elevation  Continue daily lasix and potassium chloride  Intermittent laboratory monitoring to assess electrolytes and kidney function

## 2025-01-30 NOTE — ASSESSMENT & PLAN NOTE
9/11/23: Bilateral renal artery angioplasty with stenting  Care in collaboration with his [facility] vascular surgery service  Continue  aspirin, clopidogrel, and rosuvastatin

## 2025-02-10 NOTE — ASSESSMENT & PLAN NOTE
Continue rosuvastatin  Follow up with PCP   Render Risk Assessment In Note?: no Detail Level: Simple Comment: Left Thigh BCC, S/P ED&C Dr Pendleton 9/2024

## 2025-02-20 ENCOUNTER — TELEPHONE (OUTPATIENT)
Age: 60
End: 2025-02-20

## 2025-02-20 NOTE — TELEPHONE ENCOUNTER
Marina from [facility] homehealth called in regarding paperwork that was faxed over on 2/19 regarding dme supplies. At the bottom of the form it is missing providers signature and they're requesting it to be signed and faxed back to [phone]. Called eric Jarrell and charge transferred me to . No one answered and lvm for casework to see if form could be signed and refaxed.